# Patient Record
Sex: FEMALE | Race: WHITE | NOT HISPANIC OR LATINO | Employment: FULL TIME | URBAN - METROPOLITAN AREA
[De-identification: names, ages, dates, MRNs, and addresses within clinical notes are randomized per-mention and may not be internally consistent; named-entity substitution may affect disease eponyms.]

---

## 2017-04-24 DIAGNOSIS — Z30.40 ENCOUNTER FOR SURVEILLANCE OF CONTRACEPTIVES: ICD-10-CM

## 2017-04-24 RX ORDER — NORETHINDRONE ACETATE AND ETHINYL ESTRADIOL .03; 1.5 MG/1; MG/1
1 TABLET ORAL DAILY
Qty: 84 TABLET | Refills: 0 | Status: SHIPPED | OUTPATIENT
Start: 2017-04-24 | End: 2017-04-25

## 2017-04-24 NOTE — TELEPHONE ENCOUNTER
Pending Prescriptions:                       Disp   Refills    norethindrone-ethinyl estradiol (MICROGES*84 tab*0            Sig: Take 1 tablet by mouth daily Please schedule           annual exam in August for further refills    Last OV was 8/4/16. Will be due for AE. Refill sent to pharmacy.   Mary Yadav, RN-BSN

## 2017-04-25 DIAGNOSIS — Z30.40 ENCOUNTER FOR SURVEILLANCE OF CONTRACEPTIVES: ICD-10-CM

## 2017-04-25 RX ORDER — NORETHINDRONE ACETATE AND ETHINYL ESTRADIOL .03; 1.5 MG/1; MG/1
1 TABLET ORAL DAILY
Qty: 112 TABLET | Refills: 0 | Status: SHIPPED | OUTPATIENT
Start: 2017-04-25 | End: 2017-07-17

## 2017-07-17 DIAGNOSIS — Z30.40 ENCOUNTER FOR SURVEILLANCE OF CONTRACEPTIVES: ICD-10-CM

## 2017-07-17 RX ORDER — NORETHINDRONE ACETATE AND ETHINYL ESTRADIOL .03; 1.5 MG/1; MG/1
1 TABLET ORAL DAILY
Qty: 112 TABLET | Refills: 0 | Status: SHIPPED | OUTPATIENT
Start: 2017-07-17

## 2017-07-17 NOTE — TELEPHONE ENCOUNTER
Pending Prescriptions:                       Disp   Refills    norethindrone-ethinyl estradiol (MICROGES*112 ta*0            Sig: Take 1 tablet by mouth daily To be taken           continuously    Last OV was 8/4/16. Due for AE in Aug. TC to patient. Scheduled AE for 8/15. Refill sent to pharmacy.   Mary Yadav, BRITTANY-BSN

## 2017-12-08 ENCOUNTER — THERAPY VISIT (OUTPATIENT)
Dept: PHYSICAL THERAPY | Facility: CLINIC | Age: 47
End: 2017-12-08
Payer: COMMERCIAL

## 2017-12-08 DIAGNOSIS — M75.41 IMPINGEMENT SYNDROME OF SHOULDER REGION, RIGHT: Primary | ICD-10-CM

## 2017-12-08 PROCEDURE — 97110 THERAPEUTIC EXERCISES: CPT | Mod: GP | Performed by: PHYSICAL THERAPIST

## 2017-12-08 PROCEDURE — 97161 PT EVAL LOW COMPLEX 20 MIN: CPT | Mod: GP | Performed by: PHYSICAL THERAPIST

## 2017-12-08 NOTE — MR AVS SNAPSHOT
After Visit Summary   12/8/2017    Shweta Roberts    MRN: [id number]           Patient Information     Date Of Birth          1970        Visit Information        Provider Department      12/8/2017 5:10 PM Mario Martins PT [facility]         Follow-ups after your visit        Your next 10 appointments already scheduled     Dec 08, 2017  5:10 PM CST   (Arrive by 4:55 PM)   ALLI Extremity with Mario Martins PT   [facility] ( [facility])    [facility] [address] [address] [phone]              Who to contact     If you have questions or need follow up information about today's clinic visit or your schedule please contact [facility] directly at [phone].  Normal or non-critical lab and imaging results will be communicated to you by MyChart, letter or phone within 4 business days after the clinic has received the results. If you do not hear from us within 7 days, please contact the clinic through [employer] or phone. If you have a critical or abnormal lab result, we will notify you by phone as soon as possible.  Submit refill requests through [employer] or call your pharmacy and they will forward the refill request to us. Please allow 3 business days for your refill to be completed.          Additional Information About Your Visit        MyChart Information     [employer] gives you secure access to your electronic health record. If you see a primary care provider, you can also send messages to your care team and make appointments. If you have questions, please call your primary care clinic.  If you do not have a primary care provider, please call [phone] and they will assist you.        Care EveryWhere ID     This is your Care EveryWhere ID. This could be used by other organizations to access your [facility] medical records  [id number]          Blood Pressure from Last 3 Encounters:   No data found for BP    Weight from Last 3 Encounters:   No data found for Wt              Today, you had the following     No orders found for display       Primary Care Provider    None Specified       No primary provider on file.        Equal Access to Services     LINDY MATA : Luke rj card britney Graham, danish salinas, kobe kaann funes, aileen shine laYamilkaisaac weiss. So [facility] [phone].    ATENCIÓN: Si habla español, tiene a longoria disposición servicios gratuitos de asistencia lingüística. Llame al [phone].    We comply with applicable federal civil rights laws and [place] laws. We do not discriminate on the basis of race, color, national origin, age, disability, sex, sexual orientation, or gender identity.            Thank you!     Thank you for choosing [facility]  for your care. Our goal is always to provide you with excellent care. Hearing back from our patients is one way we can continue to improve our services. Please take a few minutes to complete the written survey that you may receive in the mail after your visit with us. Thank you!             Your Updated Medication List - Protect others around you: Learn how to safely use, store and throw away your medicines at www.disposemymeds.org.          This list is accurate as of: 12/8/17 10:55 AM.  Always use your most recent med list.                   Brand Name Dispense Instructions for use Diagnosis    albuterol 108 (90 BASE) MCG/ACT Inhaler    PROAIR HFA/PROVENTIL HFA/VENTOLIN HFA    1 Inhaler    Inhale 2 puffs into the lungs every 6 hours as needed for shortness of breath / dyspnea    Acute bronchitis, unspecified organism       fexofenadine 180 MG tablet    ALLEGRA    90 tablet    Take 1 tablet (180 mg) by mouth daily    Allergic rhinitis       LORAZEPAM      Patient takes prn, but unsure what dosage?        norethindrone-ethinyl estradiol  1.5-30 MG-MCG per tablet    MICROGESTIN    112 tablet    Take 1 tablet by mouth daily To be taken continuously    Encounter for surveillance of contraceptives       pimecrolimus 1 % cream    ELIDEL    60 g    Apply twice daily as needed to areas of psoriasis    Psoriasis

## 2017-12-08 NOTE — PROGRESS NOTES
Physical Therapy Initial Examination/Evaluation  December 8, 2017    Shweta Roberts is a 47 year old female referred to physical therapy by Wojciech Carrillo MD for treatment of R shoulder impingement with Precautions/Restrictions/MD instructions E&T    Therapist Impression:   Shweta presents to physical therapy with R shoulder RC pathology.  Our treatment focus will be on scapular and rotator cuff stabilization/strengthening and posture re-education.    Subjective:  DOI/onset: 2-3 months ago; 12/7/2017 MD appt DOS: none  Acute Injury or Gradual Onset?: Acute injury onset  Mechanism of Injury: Reaching into back seat  Related PMH: None Previous Treatment: Chiropractor and  Effect of prior treatment: fair  Imaging: None  Chief Complaint/Functional Limitations:   Reaching OH and lifting weights and see below in therapy evaluation codes   Pain: rest 0 /10, activity 4/10 Location: lateral Frequency: Intermittent Described as: sore, achy Alleviated by: Rest Progression of Symptoms: Gradually getting better. Time of day when pain is worse: Activity related  Sleeping: Previously was losing sleep but is improved   Occupation: Sales  Job duties: keyboarding/computer use  Current HEP/exercise regimen: Working with   Patient's goals are see chief complaints     Other pertinent PMH/Red Flags: osteoarthritis   Barriers at home/work: None as reported by patient  Pertinent Surgical History: Breast reduction  Medications: None as reported by patient  General health as reported by patient: good/excellent  Return to MD:  prn    SHOULDER EXAMINATION  STATIC POSTURE  Forward head: mild   Rounded shoulders:mild  Shoulder internally rotated: mild   Visual inspection: NA    SHOULDER RANGE OF MOTION  AROM Flexion Abduction ER Base Ext/IR or hand behind back angle   Left 150 150  Ant Drift: na 60 T11   Right 120 pain 110 pain  Ant Drift: none 60 T12 pain   Pain: yes      PROM Flexion Abd 90-90 ER 90-90 IR Scap Stabilized Horizontal  Adduction   Left na na  na na na   Right wnl na 80 pain na na   GH indicates pure glenohumeral ROM    SHOULDER STRENGTH  MMT Flexion Scaption ER IR   Left 5/5 5/5 5/5 5/5   Right 4+/5 4+/5 5/5 5/5     Assessment/Plan:  Patient is a 47 year old female with right side shoulder complaints.    Patient has the following significant findings with corresponding treatment plan.                Diagnosis 1:  R shoulder impingement  Pain -  hot/cold therapy, manual therapy, splint/taping/bracing/orthotics, self management, education and home program  Decreased ROM/flexibility - manual therapy and therapeutic exercise  Decreased strength - therapeutic exercise and therapeutic activities  Impaired muscle performance - neuro re-education  Impaired posture - neuro re-education    Therapy Evaluation Codes:   1) History comprised of:   Personal factors that impact the plan of care:      None.    Comorbidity factors that impact the plan of care are:      None.     Medications impacting care: None.  2) Examination of Body Systems comprised of:   Body structures and functions that impact the plan of care:      Shoulder.   Activity limitations that impact the plan of care are:      Bathing, Dressing and Lifting.  3) Clinical presentation characteristics are:   Stable/Uncomplicated.  4) Decision-Making    Low complexity using standardized patient assessment instrument and/or measureable assessment of functional outcome.  Cumulative Therapy Evaluation is: Low complexity.    Previous and current functional limitations:  (See Goal Flow Sheet for this information)    Short term and Long term goals: (See Goal Flow Sheet for this information)     Communication ability:  Patient appears to be able to clearly communicate and understand verbal and written communication and follow directions correctly.  Treatment Explanation - The following has been discussed with the patient:   RX ordered/plan of care  Anticipated outcomes  Possible risks and  side effects  This patient would benefit from PT intervention to resume normal activities.   Rehab potential is good.    Frequency:  2 X a month, once daily  Duration:  for 4 months  Discharge Plan:  Achieve all LTG.  Independent in home treatment program.  Reach maximal therapeutic benefit.    Please refer to the daily flowsheet for treatment today, total treatment time and time spent performing 1:1 timed codes.         Exercise Sets x Reps Frequency Goal   Reverse flies  3 x week X 30   Rowing with arms near side of body   X 30   Triceps (not overhead)   X 20   External rotation   X 30   Internal rotation   X 30   Pendulum/circles on back   X 30

## 2017-12-12 ENCOUNTER — OFFICE VISIT (OUTPATIENT)
Dept: FAMILY MEDICINE | Facility: CLINIC | Age: 47
End: 2017-12-12
Payer: COMMERCIAL

## 2017-12-12 VITALS
HEART RATE: 77 BPM | RESPIRATION RATE: 14 BRPM | OXYGEN SATURATION: 99 % | TEMPERATURE: 98.5 F | WEIGHT: 219 LBS | SYSTOLIC BLOOD PRESSURE: 110 MMHG | BODY MASS INDEX: 32.34 KG/M2 | DIASTOLIC BLOOD PRESSURE: 72 MMHG

## 2017-12-12 DIAGNOSIS — J01.90 ACUTE SINUSITIS WITH SYMPTOMS > 10 DAYS: Primary | ICD-10-CM

## 2017-12-12 DIAGNOSIS — J30.9 ALLERGIC RHINITIS, UNSPECIFIED CHRONICITY, UNSPECIFIED SEASONALITY, UNSPECIFIED TRIGGER: ICD-10-CM

## 2017-12-12 DIAGNOSIS — F41.9 ANXIETY: ICD-10-CM

## 2017-12-12 PROCEDURE — 99214 OFFICE O/P EST MOD 30 MIN: CPT | Performed by: FAMILY MEDICINE

## 2017-12-12 RX ORDER — LORAZEPAM 100 %
POWDER (GRAM) MISCELLANEOUS
Status: CANCELLED | OUTPATIENT
Start: 2017-12-12

## 2017-12-12 RX ORDER — LORAZEPAM 0.5 MG/1
0.25-0.5 TABLET ORAL EVERY 8 HOURS PRN
Qty: 15 TABLET | Refills: 0 | Status: SHIPPED | OUTPATIENT
Start: 2017-12-12

## 2017-12-12 RX ORDER — DOXYCYCLINE 100 MG/1
100 CAPSULE ORAL 2 TIMES DAILY
Qty: 14 CAPSULE | Refills: 0 | Status: SHIPPED | OUTPATIENT
Start: 2017-12-12 | End: 2017-12-19

## 2017-12-12 NOTE — PROGRESS NOTES
SUBJECTIVE:   Shweta Roberts is a 47 year old female who presents to clinic today for the following health issues:      Acute Illness   Acute illness concerns: Sinus Infection  Onset: Couple weeks     Fever: YES    Chills/Sweats: YES    Headache (location?): YES    Sinus Pressure:YES    Conjunctivitis:  YES: bilateral    Ear Pain: no    Rhinorrhea: YES    Congestion: YES    Sore Throat: YES     Cough: YES-productive of yellow sputum    Wheeze: no    Decreased Appetite: YES    Nausea: no    Vomiting: no    Diarrhea:  no    Dysuria/Freq.: YES- increased fluids     Fatigue/Achiness: Fatigue     Sick/Strep Exposure: no     Therapies Tried and outcome: Vitamins, Mucinex, Advil and Sudafed          In October she developed a cold, symptoms improved, but continued to drag on. The last couple of weeks she has had sinus symptoms, with facial pain, congestion, rhinorrhea, etc. Her asthma has not flared. It typically bothers her when it is humid outside and she is running. She initially used her inhaler with chest tightness but has not had to use it in the last couple weeks.    She is requesting to have her lorazepam refilled. She uses it infrequently for panic attacks. It helps her knowing that the medication is there. She has had 2-3 refills in the past 7-8 years. Patient is not taking a daily anti anxiety medication. She runs for anxiety relief.       Problem list and histories reviewed & adjusted, as indicated.  Additional history: as documented  Patient Active Problem List   Diagnosis     Anxiety     Asthma, mild intermittent     Allergic rhinitis     Knee pain     Hamstring muscle strain     Pain in joint involving ankle and foot     Psoriasis     Neoplasm of uncertain behavior of skin     S/P LEEP of cervix     Impingement syndrome of shoulder region, right     Past Surgical History:   Procedure Laterality Date     BREAST BIOPSY, CORE RT/LT  1999    left s/p nipple discharge     LEEP TX, CERVICAL  2012?    [facility]  [place]     wisdom teeth      extracted       Social History   Substance Use Topics     Smoking status: Never Smoker     Smokeless tobacco: Never Used     Alcohol use Yes      Comment: occ.     Family History   Problem Relation Age of Onset     CANCER Mother      skin cancer     CANCER Father      skin cancer     CANCER Sister      skin cancer         Current Outpatient Prescriptions   Medication Sig Dispense Refill     doxycycline (VIBRAMYCIN) 100 MG capsule Take 1 capsule (100 mg) by mouth 2 times daily for 7 days 14 capsule 0     LORazepam (ATIVAN) 0.5 MG tablet Take 0.5-1 tablets (0.25-0.5 mg) by mouth every 8 hours as needed for anxiety Do not operate a vehicle after taking this medication 15 tablet 0     norethindrone-ethinyl estradiol (MICROGESTIN) 1.5-30 MG-MCG per tablet Take 1 tablet by mouth daily To be taken continuously 112 tablet 0     albuterol (PROAIR HFA, PROVENTIL HFA, VENTOLIN HFA) 108 (90 BASE) MCG/ACT inhaler Inhale 2 puffs into the lungs every 6 hours as needed for shortness of breath / dyspnea 1 Inhaler 0     fexofenadine (ALLEGRA) 180 MG tablet Take 1 tablet (180 mg) by mouth daily 90 tablet 3     pimecrolimus (ELIDEL) 1 % cream Apply twice daily as needed to areas of psoriasis 60 g 3     Allergies   Allergen Reactions     Amoxicillin Hives     Mold      Recent Labs   Lab Test  07/01/15   1156  09/11/13   0853   HDL  75   --    ALT   --   21   CR  0.74  0.77   GFRESTIMATED  85  82   GFRESTBLACK  >90   GFR Calc    >90   POTASSIUM  3.9  4.1   TSH  0.59   --       BP Readings from Last 3 Encounters:   12/12/17 110/72   08/04/16 125/78   02/29/16 130/66    Wt Readings from Last 3 Encounters:   12/12/17 99.3 kg (219 lb)   08/04/16 94.7 kg (208 lb 12.8 oz)   02/29/16 93.9 kg (207 lb)        Reviewed and updated as needed this visit by clinical staffTobacco  Allergies  Meds  Med Hx  Surg Hx  Fam Hx  Soc Hx      Reviewed and updated as needed this visit by Provider          ROS:  See above.    This document serves as a record of the services and decisions personally performed and made by Zoila Kunz MD. It was created on his/her behalf by Anahi Keller, trained medical scribe. The creation of this document is based the provider's statements to the medical scribes.    Scribe Anahi Keller, December 12, 2017  OBJECTIVE:     /72  Pulse 77  Temp 98.5  F (36.9  C) (Oral)  Resp 14  Wt 99.3 kg (219 lb)  SpO2 99%  BMI 32.34 kg/m2  Body mass index is 32.34 kg/(m^2).  GENERAL: healthy, alert and no distress  HENT: ear canals and TM's normal, nose and mouth without ulcers or lesions  NECK: no adenopathy, no asymmetry, masses, or scars and thyroid normal to palpation  RESP: lungs clear to auscultation - no rales, rhonchi or wheezes  CV: regular rate and rhythm, normal S1 S2, no S3 or S4, no murmur, click or rub  PSYCH: mentation appears normal, affect normal/bright    Diagnostic Test Results:  none     ASSESSMENT/PLAN:   1. Acute sinusitis with symptoms > 10 days  Will treat for possible bacterial sinusitis. See pt instructions   - doxycycline (VIBRAMYCIN) 100 MG capsule; Take 1 capsule (100 mg) by mouth 2 times daily for 7 days  Dispense: 14 capsule; Refill: 0    2. Anxiety  Very rare use of lorazepam. Requests refill for episodes of panic. Her usual way to manage is running, which she is unable to do since she is ill.   - LORazepam (ATIVAN) 0.5 MG tablet; Take 0.5-1 tablets (0.25-0.5 mg) by mouth every 8 hours as needed for anxiety Do not operate a vehicle after taking this medication  Dispense: 15 tablet; Refill: 0    3. Allergic rhinitis, unspecified chronicity, unspecified seasonality, unspecified trigger  Uses nasal saline and OTC antihistamine        Patient Instructions     1. Start doxycycline 100mg twice daily for 7 days  2. Continue nasal saline  3. Let us know if you are not improving or if worsening   Acute Bacterial Rhinosinusitis (ABRS)    Acute bacterial  rhinosinusitis (ABRS) is an infection of your nasal cavity and sinuses. It s caused by bacteria. Acute means that you ve had symptoms for less than 4 weeks, but possibly up to 12 weeks.  Understanding your sinuses  The nasal cavity is the large air-filled space behind your nose. The sinuses are a group of spaces formed by the bones of your face. They connect with your nasal cavity. ABRS causes the tissue lining these spaces to become inflamed. Mucus may not drain normally. This leads to facial pain and other symptoms.  What causes ABRS?  ABRS most often follows an upper respiratory infection caused by a virus. Bacteria then infect the lining of your nasal cavity and sinuses. But you can also get ABRS if you have:    Nasal allergies    Long-term nasal swelling and congestion not caused by allergies    Blockage in the nose  Symptoms of ABRS  The symptoms of ABRS may be different for each person and include:    Nasal congestion or blockage    Pain or pressure in the face    Thick, colored drainage from the nose  Other symptoms may include:    Runny nose    Fluid draining from the nose down the throat (postnasal drip)    Headache    Cough    Pain    Fever  Diagnosing ABRS  ABRS may be diagnosed if you ve had an upper respiratory infection like a cold and cough for 10 or more days without improvement or with worsening symptoms. Your healthcare provider will ask about your symptoms and your medical history. The provider will check your vital signs, including your temperature. You ll have a physical exam. The healthcare provider will check your ears, nose, and throat. You likely won t need any tests. If ABRS comes back, you may have a culture or other tests.  Treatment for ABRS  Treatment may include:    Antibiotic medicine. This is for symptoms that last for at least 10 to 14 days.    Nasal corticosteroid medicine. Drops or spray used in the nose can lessen swelling and congestion.    Over-the-counter pain medicine. This  is to lessen sinus pain and pressure.    Nasal decongestant medicine. Spray or drops may help to lessen congestion. Do not use them for more than a few days.    Salt wash (saline irrigation). This can help to loosen mucus.  Possible complications of ABRS  ABRS may come back or become long-term (chronic). In rare cases, ABRS may cause complications such as:     Inflamed tissue around the brain and spinal cord (meningitis)    Inflamed tissue around the eyes (orbital cellulitis)    Inflamed bones around the sinuses (osteitis)  These problems may need to be treated in a hospital with intravenous (IV) antibiotic medicine or surgery.  When to call the healthcare provider  Call your healthcare provider if you have any of the following:    Symptoms that don t get better, or get worse    Symptoms that don t get better after 3 to 5 days on antibiotics    Trouble seeing    Swelling around your eyes    Confusion or trouble staying awake   Date Last Reviewed: 5/1/2017 2000-2017 [employer]. [address] [place] [address]. All rights reserved. This information is not intended as a substitute for professional medical care. Always follow your healthcare professional's instructions.            The information in this document, created by the medical scribe for me, accurately reflects the services I personally performed and the decisions made by me. I have reviewed and approved this document for accuracy. 12/12/17    Zoila Kunz MD  [facility]

## 2017-12-12 NOTE — PATIENT INSTRUCTIONS
1. Start doxycycline 100mg twice daily for 7 days  2. Continue nasal saline  3. Let us know if you are not improving or if worsening   Acute Bacterial Rhinosinusitis (ABRS)    Acute bacterial rhinosinusitis (ABRS) is an infection of your nasal cavity and sinuses. It s caused by bacteria. Acute means that you ve had symptoms for less than 4 weeks, but possibly up to 12 weeks.  Understanding your sinuses  The nasal cavity is the large air-filled space behind your nose. The sinuses are a group of spaces formed by the bones of your face. They connect with your nasal cavity. ABRS causes the tissue lining these spaces to become inflamed. Mucus may not drain normally. This leads to facial pain and other symptoms.  What causes ABRS?  ABRS most often follows an upper respiratory infection caused by a virus. Bacteria then infect the lining of your nasal cavity and sinuses. But you can also get ABRS if you have:    Nasal allergies    Long-term nasal swelling and congestion not caused by allergies    Blockage in the nose  Symptoms of ABRS  The symptoms of ABRS may be different for each person and include:    Nasal congestion or blockage    Pain or pressure in the face    Thick, colored drainage from the nose  Other symptoms may include:    Runny nose    Fluid draining from the nose down the throat (postnasal drip)    Headache    Cough    Pain    Fever  Diagnosing ABRS  ABRS may be diagnosed if you ve had an upper respiratory infection like a cold and cough for 10 or more days without improvement or with worsening symptoms. Your healthcare provider will ask about your symptoms and your medical history. The provider will check your vital signs, including your temperature. You ll have a physical exam. The healthcare provider will check your ears, nose, and throat. You likely won t need any tests. If ABRS comes back, you may have a culture or other tests.  Treatment for ABRS  Treatment may include:    Antibiotic medicine. This is  for symptoms that last for at least 10 to 14 days.    Nasal corticosteroid medicine. Drops or spray used in the nose can lessen swelling and congestion.    Over-the-counter pain medicine. This is to lessen sinus pain and pressure.    Nasal decongestant medicine. Spray or drops may help to lessen congestion. Do not use them for more than a few days.    Salt wash (saline irrigation). This can help to loosen mucus.  Possible complications of ABRS  ABRS may come back or become long-term (chronic). In rare cases, ABRS may cause complications such as:     Inflamed tissue around the brain and spinal cord (meningitis)    Inflamed tissue around the eyes (orbital cellulitis)    Inflamed bones around the sinuses (osteitis)  These problems may need to be treated in a hospital with intravenous (IV) antibiotic medicine or surgery.  When to call the healthcare provider  Call your healthcare provider if you have any of the following:    Symptoms that don t get better, or get worse    Symptoms that don t get better after 3 to 5 days on antibiotics    Trouble seeing    Swelling around your eyes    Confusion or trouble staying awake   Date Last Reviewed: 5/1/2017 2000-2017 [employer]. [address] [place] [address]. All rights reserved. This information is not intended as a substitute for professional medical care. Always follow your healthcare professional's instructions.

## 2017-12-12 NOTE — MR AVS SNAPSHOT
After Visit Summary   12/12/2017    Shweta Roberts    MRN: [id number]           Patient Information     Date Of Birth          1970        Visit Information        Provider Department      12/12/2017 2:40 PM Zoila Kunz MD Formerly named [facility]        Today's Diagnoses     Acute sinusitis with symptoms > 10 days    -  1    Anxiety        Allergic rhinitis, unspecified chronicity, unspecified seasonality, unspecified trigger          Care Instructions    1. Start doxycycline 100mg twice daily for 7 days  2. Continue nasal saline  3. Let us know if you are not improving or if worsening   Acute Bacterial Rhinosinusitis (ABRS)    Acute bacterial rhinosinusitis (ABRS) is an infection of your nasal cavity and sinuses. It s caused by bacteria. Acute means that you ve had symptoms for less than 4 weeks, but possibly up to 12 weeks.  Understanding your sinuses  The nasal cavity is the large air-filled space behind your nose. The sinuses are a group of spaces formed by the bones of your face. They connect with your nasal cavity. ABRS causes the tissue lining these spaces to become inflamed. Mucus may not drain normally. This leads to facial pain and other symptoms.  What causes ABRS?  ABRS most often follows an upper respiratory infection caused by a virus. Bacteria then infect the lining of your nasal cavity and sinuses. But you can also get ABRS if you have:    Nasal allergies    Long-term nasal swelling and congestion not caused by allergies    Blockage in the nose  Symptoms of ABRS  The symptoms of ABRS may be different for each person and include:    Nasal congestion or blockage    Pain or pressure in the face    Thick, colored drainage from the nose  Other symptoms may include:    Runny nose    Fluid draining from the nose down the throat (postnasal drip)    Headache    Cough    Pain    Fever  Diagnosing ABRS  ABRS may be diagnosed if you ve had an upper respiratory infection like a cold and  cough for 10 or more days without improvement or with worsening symptoms. Your healthcare provider will ask about your symptoms and your medical history. The provider will check your vital signs, including your temperature. You ll have a physical exam. The healthcare provider will check your ears, nose, and throat. You likely won t need any tests. If ABRS comes back, you may have a culture or other tests.  Treatment for ABRS  Treatment may include:    Antibiotic medicine. This is for symptoms that last for at least 10 to 14 days.    Nasal corticosteroid medicine. Drops or spray used in the nose can lessen swelling and congestion.    Over-the-counter pain medicine. This is to lessen sinus pain and pressure.    Nasal decongestant medicine. Spray or drops may help to lessen congestion. Do not use them for more than a few days.    Salt wash (saline irrigation). This can help to loosen mucus.  Possible complications of ABRS  ABRS may come back or become long-term (chronic). In rare cases, ABRS may cause complications such as:     Inflamed tissue around the brain and spinal cord (meningitis)    Inflamed tissue around the eyes (orbital cellulitis)    Inflamed bones around the sinuses (osteitis)  These problems may need to be treated in a hospital with intravenous (IV) antibiotic medicine or surgery.  When to call the healthcare provider  Call your healthcare provider if you have any of the following:    Symptoms that don t get better, or get worse    Symptoms that don t get better after 3 to 5 days on antibiotics    Trouble seeing    Swelling around your eyes    Confusion or trouble staying awake   Date Last Reviewed: 5/1/2017 2000-2017 [employer]. [address] [place] [address], [place] [address]. All rights reserved. This information is not intended as a substitute for professional medical care. Always follow your healthcare professional's instructions.                Follow-ups after your visit        Your  next 10 appointments already scheduled     Dec 21, 2017  7:00 AM CST   ALLI Extremity with Mario Villasenorbo, PT   [facility] ([facility])    [address] [place] [id number] [phone]            Dec 28, 2017  8:20 AM CST   ALLI Extremity with Mario Villasenorbo, PT   [facility] ([facility])    [address] [place] [id number] [phone]              Who to contact     If you have questions or need follow up information about today's clinic visit or your schedule please contact [facility] directly at [phone].  Normal or non-critical lab and imaging results will be communicated to you by [employer], letter or phone within 4 business days after the clinic has received the results. If you do not hear from us within 7 days, please contact the clinic through Open[employer]t or phone. If you have a critical or abnormal lab result, we will notify you by phone as soon as possible.  Submit refill requests through [employer] or call your pharmacy and they will forward the refill request to us. Please allow 3 business days for your refill to be completed.          Additional Information About Your Visit        Trial[employer]ach Information     [employer] gives you secure access to your electronic health record. If you see a primary care provider, you can also send messages to your care team and make appointments. If you have questions, please call your primary care clinic.  If you do not have a primary care provider, please call [phone] and they will assist you.        Care EveryWhere ID     This is your Care EveryWhere ID. This could be used by other organizations to access your [place] [facility] medical records  [id number]        Your Vitals Were     Pulse Temperature Respirations Pulse Oximetry BMI (Body Mass Index)       77 98.5  F (36.9  C) (Oral) 14 99% 32.34 kg/m2        Blood  Pressure from Last 3 Encounters:   12/12/17 110/72   08/04/16 125/78   02/29/16 130/66    Weight from Last 3 Encounters:   12/12/17 219 lb (99.3 kg)   08/04/16 208 lb 12.8 oz (94.7 kg)   02/29/16 207 lb (93.9 kg)              Today, you had the following     No orders found for display         Today's Medication Changes          These changes are accurate as of: 12/12/17  3:13 PM.  If you have any questions, ask your nurse or doctor.               Start taking these medicines.        Dose/Directions    doxycycline 100 MG capsule   Commonly known as:  VIBRAMYCIN   Used for:  Acute sinusitis with symptoms > 10 days   Started by:  Zoila Kunz MD        Dose:  100 mg   Take 1 capsule (100 mg) by mouth 2 times daily for 7 days   Quantity:  14 capsule   Refills:  0       LORazepam 0.5 MG tablet   Commonly known as:  ATIVAN   Used for:  Anxiety   Started by:  Zoila Kunz MD        Dose:  0.25-0.5 mg   Take 0.5-1 tablets (0.25-0.5 mg) by mouth every 8 hours as needed for anxiety Do not operate a vehicle after taking this medication   Quantity:  15 tablet   Refills:  0            Where to get your medicines      These medications were sent to [facility] [address] [place] [address] AT [address] [address]     Phone:  [phone]     doxycycline 100 MG capsule         Some of these will need a paper prescription and others can be bought over the counter.  Ask your nurse if you have questions.     Bring a paper prescription for each of these medications     LORazepam 0.5 MG tablet                Primary Care Provider    None Specified       No primary provider on file.        Equal Access to Services     [facility]: danish Funez, aileen patel. So [facility] [phone].    ATENCIÓN: Si habla español, tiene a longoria disposición servicios gratuitos de asistencia lingüística.  Margot santos [phone].    We comply with applicable federal civil rights laws and [place] laws. We do not discriminate on the basis of race, color, national origin, age, disability, sex, sexual orientation, or gender identity.            Thank you!     Thank you for choosing [facility]  for your care. Our goal is always to provide you with excellent care. Hearing back from our patients is one way we can continue to improve our services. Please take a few minutes to complete the written survey that you may receive in the mail after your visit with us. Thank you!             Your Updated Medication List - Protect others around you: Learn how to safely use, store and throw away your medicines at www.disposemymeds.org.          This list is accurate as of: 12/12/17  3:13 PM.  Always use your most recent med list.                   Brand Name Dispense Instructions for use Diagnosis    albuterol 108 (90 BASE) MCG/ACT Inhaler    PROAIR HFA/PROVENTIL HFA/VENTOLIN HFA    1 Inhaler    Inhale 2 puffs into the lungs every 6 hours as needed for shortness of breath / dyspnea    Acute bronchitis, unspecified organism       doxycycline 100 MG capsule    VIBRAMYCIN    14 capsule    Take 1 capsule (100 mg) by mouth 2 times daily for 7 days    Acute sinusitis with symptoms > 10 days       fexofenadine 180 MG tablet    ALLEGRA    90 tablet    Take 1 tablet (180 mg) by mouth daily    Allergic rhinitis       LORazepam 0.5 MG tablet    ATIVAN    15 tablet    Take 0.5-1 tablets (0.25-0.5 mg) by mouth every 8 hours as needed for anxiety Do not operate a vehicle after taking this medication    Anxiety       norethindrone-ethinyl estradiol 1.5-30 MG-MCG per tablet    MICROGESTIN    112 tablet    Take 1 tablet by mouth daily To be taken continuously    Encounter for surveillance of contraceptives       pimecrolimus 1 % cream    ELIDEL    60 g    Apply twice daily as needed to areas of psoriasis    Psoriasis

## 2017-12-13 ASSESSMENT — ASTHMA QUESTIONNAIRES: ACT_TOTALSCORE: 23

## 2017-12-21 ENCOUNTER — THERAPY VISIT (OUTPATIENT)
Dept: PHYSICAL THERAPY | Facility: CLINIC | Age: 47
End: 2017-12-21
Payer: COMMERCIAL

## 2017-12-21 DIAGNOSIS — M75.41 IMPINGEMENT SYNDROME OF SHOULDER REGION, RIGHT: ICD-10-CM

## 2017-12-21 PROCEDURE — 97140 MANUAL THERAPY 1/> REGIONS: CPT | Mod: GP | Performed by: PHYSICAL THERAPIST

## 2017-12-21 PROCEDURE — 97110 THERAPEUTIC EXERCISES: CPT | Mod: GP | Performed by: PHYSICAL THERAPIST

## 2017-12-21 PROCEDURE — 97530 THERAPEUTIC ACTIVITIES: CPT | Mod: GP | Performed by: PHYSICAL THERAPIST

## 2017-12-21 NOTE — MR AVS SNAPSHOT
After Visit Summary   12/21/2017    Shweta Roberts    MRN: [id number]           Patient Information     Date Of Birth          1970        Visit Information        Provider Department      12/21/2017 7:00 AM Mario Martins PT [facility]        Today's Diagnoses     Impingement syndrome of shoulder region, right           Follow-ups after your visit        Your next 10 appointments already scheduled     Dec 28, 2017  8:20 AM CST   ALLI Extremity with Mario Martins PT   [facility] ( [facility])    [address] [place] [id number] [phone]              Who to contact     If you have questions or need follow up information about today's clinic visit or your schedule please contact [facility] directly at [phone].  Normal or non-critical lab and imaging results will be communicated to you by [employer], letter or phone within 4 business days after the clinic has received the results. If you do not hear from us within 7 days, please contact the clinic through [employer] or phone. If you have a critical or abnormal lab result, we will notify you by phone as soon as possible.  Submit refill requests through [employer] or call your pharmacy and they will forward the refill request to us. Please allow 3 business days for your refill to be completed.          Additional Information About Your Visit        [employer] Information     [employer] gives you secure access to your electronic health record. If you see a primary care provider, you can also send messages to your care team and make appointments. If you have questions, please call your primary care clinic.  If you do not have a primary care provider, please call [phone] and they will assist you.        Care EveryWhere ID     This is your Care EveryWhere ID. This could be used by other organizations to  access your [facility] medical records  [id number]         Blood Pressure from Last 3 Encounters:   12/12/17 110/72   08/04/16 125/78   02/29/16 130/66    Weight from Last 3 Encounters:   12/12/17 99.3 kg (219 lb)   08/04/16 94.7 kg (208 lb 12.8 oz)   02/29/16 93.9 kg (207 lb)              We Performed the Following     MANUAL THER TECH,1+REGIONS,EA 15 MIN     THERAPEUTIC ACTIVITIES     THERAPEUTIC EXERCISES        Primary Care Provider    None Specified       No primary provider on file.        Equal Access to Services     [facility]: Hadii aad kyaw hadbrysono Sokatharine, waaxda luqadaha, qaybta kaalmada marin, aileen clayton . So [facility] [phone].    ATENCIÓN: Si habla español, tiene a longoria disposición servicios gratuitos de asistencia lingüística. Llame al [phone].    We comply with applicable federal civil rights laws and [place] laws. We do not discriminate on the basis of race, color, national origin, age, disability, sex, sexual orientation, or gender identity.            Thank you!     Thank you for choosing [facility] [place]  for your care. Our goal is always to provide you with excellent care. Hearing back from our patients is one way we can continue to improve our services. Please take a few minutes to complete the written survey that you may receive in the mail after your visit with us. Thank you!             Your Updated Medication List - Protect others around you: Learn how to safely use, store and throw away your medicines at www.disposemymeds.org.          This list is accurate as of: 12/21/17  7:48 AM.  Always use your most recent med list.                   Brand Name Dispense Instructions for use Diagnosis    albuterol 108 (90 BASE) MCG/ACT Inhaler    PROAIR HFA/PROVENTIL HFA/VENTOLIN HFA    1 Inhaler    Inhale 2 puffs into the lungs every 6 hours as needed for shortness of breath / dyspnea    Acute bronchitis, unspecified organism        fexofenadine 180 MG tablet    ALLEGRA    90 tablet    Take 1 tablet (180 mg) by mouth daily    Allergic rhinitis       LORazepam 0.5 MG tablet    ATIVAN    15 tablet    Take 0.5-1 tablets (0.25-0.5 mg) by mouth every 8 hours as needed for anxiety Do not operate a vehicle after taking this medication    Anxiety       norethindrone-ethinyl estradiol 1.5-30 MG-MCG per tablet    MICROGESTIN    112 tablet    Take 1 tablet by mouth daily To be taken continuously    Encounter for surveillance of contraceptives       pimecrolimus 1 % cream    ELIDEL    60 g    Apply twice daily as needed to areas of psoriasis    Psoriasis

## 2017-12-21 NOTE — PROGRESS NOTES
SHOULDER RANGE OF MOTION  AROM Flexion Abduction ER Base Ext/IR or hand behind back angle   Left 150 150  Ant Drift: na 60 T11   Right 114 pain  135 post  pain  Ant Drift: none 60 T12 pain   Pain: yes      PROM Flexion Abd 90-90 ER 90-90 IR Scap Stabilized Horizontal Adduction   Left na na  na na na   Right 125 na wnl no pain na na   GH indicates pure glenohumeral ROM    Exercise Sets x Reps Frequency Goal   Reverse flies  3 x week X 30   Rowing with arms near side of body   X 30   Triceps (not overhead)   X 20   External rotation   X 30   Internal rotation   X 30   Pendulum/circles on back   X 30   Bent over bowling    X 30   On back pull band apart to touch chest with arms straight   X 30

## 2017-12-28 ENCOUNTER — THERAPY VISIT (OUTPATIENT)
Dept: PHYSICAL THERAPY | Facility: CLINIC | Age: 47
End: 2017-12-28
Payer: COMMERCIAL

## 2017-12-28 DIAGNOSIS — M75.41 IMPINGEMENT SYNDROME OF SHOULDER REGION, RIGHT: ICD-10-CM

## 2017-12-28 PROCEDURE — 97140 MANUAL THERAPY 1/> REGIONS: CPT | Mod: GP | Performed by: PHYSICAL THERAPIST

## 2017-12-28 PROCEDURE — 97110 THERAPEUTIC EXERCISES: CPT | Mod: GP | Performed by: PHYSICAL THERAPIST

## 2017-12-28 PROCEDURE — 97530 THERAPEUTIC ACTIVITIES: CPT | Mod: GP | Performed by: PHYSICAL THERAPIST

## 2017-12-28 NOTE — PROGRESS NOTES
SHOULDER RANGE OF MOTION  AROM Flexion Abduction ER Base Ext/IR or hand behind back angle   Left 150 150  Ant Drift: na 60 T11   Right 135 post  pain  Ant Drift: none 60 T12 pain   Pain: yes      PROM Flexion Abd 90-90 ER 90-90 IR Scap Stabilized Horizontal Adduction   Left na na  na na na   Right 125 na wnl no pain na na   GH indicates pure glenohumeral ROM    Exercise Sets x Reps Frequency Goal   Reverse flies  3 x week X 30   Rowing with arms near side of body   X 30   Triceps (not overhead)   X 20   External rotation   X 30   Internal rotation   X 30   Pendulum/circles on back   X 30   Bent over bowling    X 30   On back pull band apart to touch chest with arms straight   X 30

## 2017-12-28 NOTE — MR AVS SNAPSHOT
After Visit Summary   12/28/2017    Shweta Roberts    MRN: [id number]           Patient Information     Date Of Birth          1970        Visit Information        Provider Department      12/28/2017 8:20 AM Mario Martins PT [facility]        Today's Diagnoses     Impingement syndrome of shoulder region, right           Follow-ups after your visit        Your next 10 appointments already scheduled     Jan 11, 2018  8:20 AM CST   ALLI Extremity with Mario Martins PT   [facility] ( [facility])    [address] [place] [id number] [phone]              Who to contact     If you have questions or need follow up information about today's clinic visit or your schedule please contact [facility] directly at [phone].  Normal or non-critical lab and imaging results will be communicated to you by [employer]hart, letter or phone within 4 business days after the clinic has received the results. If you do not hear from us within 7 days, please contact the clinic through [employer] or phone. If you have a critical or abnormal lab result, we will notify you by phone as soon as possible.  Submit refill requests through [email or web address] or call your pharmacy and they will forward the refill request to us. Please allow 3 business days for your refill to be completed.          Additional Information About Your Visit        [employer] Information     [email or web address] gives you secure access to your electronic health record. If you see a primary care provider, you can also send messages to your care team and make appointments. If you have questions, please call your primary care clinic.  If you do not have a primary care provider, please call [phone] and they will assist you.        Care EveryWhere ID     This is your Care EveryWhere ID. This could be used by other organizations to  access your [facility] medical records  [id number]         Blood Pressure from Last 3 Encounters:   12/12/17 110/72   08/04/16 125/78   02/29/16 130/66    Weight from Last 3 Encounters:   12/12/17 99.3 kg (219 lb)   08/04/16 94.7 kg (208 lb 12.8 oz)   02/29/16 93.9 kg (207 lb)              We Performed the Following     MANUAL THER TECH,1+REGIONS,EA 15 MIN     THERAPEUTIC ACTIVITIES     THERAPEUTIC EXERCISES        Primary Care Provider    None Specified       No primary provider on file.        Equal Access to Services     [facility]: Hadii aad kyaw hadasho Sokatharine, waaxda luqadaha, qaybta kaalmada marin, aileen clayton . So [facility] [phone].    ATENCIÓN: Si habla español, tiene a longoria disposición servicios gratuitos de asistencia lingüística. Llame al [phone].    We comply with applicable federal civil rights laws and [place] laws. We do not discriminate on the basis of race, color, national origin, age, disability, sex, sexual orientation, or gender identity.            Thank you!     Thank you for choosing [facility] [place]  for your care. Our goal is always to provide you with excellent care. Hearing back from our patients is one way we can continue to improve our services. Please take a few minutes to complete the written survey that you may receive in the mail after your visit with us. Thank you!             Your Updated Medication List - Protect others around you: Learn how to safely use, store and throw away your medicines at www.disposemymeds.org.          This list is accurate as of: 12/28/17  4:37 PM.  Always use your most recent med list.                   Brand Name Dispense Instructions for use Diagnosis    albuterol 108 (90 BASE) MCG/ACT Inhaler    PROAIR HFA/PROVENTIL HFA/VENTOLIN HFA    1 Inhaler    Inhale 2 puffs into the lungs every 6 hours as needed for shortness of breath / dyspnea    Acute bronchitis, unspecified organism        fexofenadine 180 MG tablet    ALLEGRA    90 tablet    Take 1 tablet (180 mg) by mouth daily    Allergic rhinitis       LORazepam 0.5 MG tablet    ATIVAN    15 tablet    Take 0.5-1 tablets (0.25-0.5 mg) by mouth every 8 hours as needed for anxiety Do not operate a vehicle after taking this medication    Anxiety       norethindrone-ethinyl estradiol 1.5-30 MG-MCG per tablet    MICROGESTIN    112 tablet    Take 1 tablet by mouth daily To be taken continuously    Encounter for surveillance of contraceptives       pimecrolimus 1 % cream    ELIDEL    60 g    Apply twice daily as needed to areas of psoriasis    Psoriasis

## 2018-01-11 ENCOUNTER — THERAPY VISIT (OUTPATIENT)
Dept: PHYSICAL THERAPY | Facility: CLINIC | Age: 48
End: 2018-01-11
Payer: COMMERCIAL

## 2018-01-11 DIAGNOSIS — M75.41 IMPINGEMENT SYNDROME OF SHOULDER REGION, RIGHT: ICD-10-CM

## 2018-01-11 PROCEDURE — 97530 THERAPEUTIC ACTIVITIES: CPT | Mod: GP | Performed by: PHYSICAL THERAPIST

## 2018-01-11 PROCEDURE — 97140 MANUAL THERAPY 1/> REGIONS: CPT | Mod: GP | Performed by: PHYSICAL THERAPIST

## 2018-01-11 NOTE — MR AVS SNAPSHOT
After Visit Summary   1/11/2018    Shweta Roberts    MRN: [id number]           Patient Information     Date Of Birth          1970        Visit Information        Provider Department      1/11/2018 8:20 AM Mario Martins PT [facility] [place]        Today's Diagnoses     Impingement syndrome of shoulder region, right           Follow-ups after your visit        Your next 10 appointments already scheduled     Rodríguez 15, 2018  6:00 PM CST   (Arrive by 5:45 PM)   Return Visit with SYDNEE Hernandez [facility] ([facility])    [address]  [address]rd [facility] [address] [phone]            Jan 29, 2018  3:00 PM CST   ALLI Extremity with Mario Martins PT   [facility] [place] ( [facility])    [facility]  [address]  [facility] [id number] [phone]              Who to contact     If you have questions or need follow up information about today's clinic visit or your schedule please contact [facility] directly at [phone].  Normal or non-critical lab and imaging results will be communicated to you by Reddithart, letter or phone within 4 business days after the clinic has received the results. If you do not hear from us within 7 days, please contact the clinic through Reddithart or phone. If you have a critical or abnormal lab result, we will notify you by phone as soon as possible.  Submit refill requests through ThumbAd or call your pharmacy and they will forward the refill request to us. Please allow 3 business days for your refill to be completed.          Additional Information About Your Visit        Reddithart Information     ThumbAd gives you secure access to your electronic health record. If you see a primary care provider, you can also send messages to your care team and make appointments. If you have questions,  please call your primary care clinic.  If you do not have a primary care provider, please call [phone] and they will assist you.        Care EveryWhere ID     This is your Care EveryWhere ID. This could be used by other organizations to access your [facility] medical records  [id number]         Blood Pressure from Last 3 Encounters:   12/12/17 110/72   08/04/16 125/78   02/29/16 130/66    Weight from Last 3 Encounters:   12/12/17 99.3 kg (219 lb)   08/04/16 94.7 kg (208 lb 12.8 oz)   02/29/16 93.9 kg (207 lb)              We Performed the Following     MANUAL THER TECH,1+REGIONS,EA 15 MIN     THERAPEUTIC ACTIVITIES        Primary Care Provider    None Specified       No primary provider on file.        Equal Access to Services     LINDY MATA : Luke Graham, wasantana tuckerqadaha, qaybta kaalmaalejandro funes, aileen clayton . So [facility] [phone].    ATENCIÓN: Si habla español, tiene a longoria disposición servicios gratuitos de asistencia lingüística. Llame al [phone].    We comply with applicable federal civil rights laws and [place] laws. We do not discriminate on the basis of race, color, national origin, age, disability, sex, sexual orientation, or gender identity.            Thank you!     Thank you for choosing [facility] [place]  for your care. Our goal is always to provide you with excellent care. Hearing back from our patients is one way we can continue to improve our services. Please take a few minutes to complete the written survey that you may receive in the mail after your visit with us. Thank you!             Your Updated Medication List - Protect others around you: Learn how to safely use, store and throw away your medicines at www.disposemymeds.org.          This list is accurate as of: 1/11/18  9:02 AM.  Always use your most recent med list.                   Brand Name Dispense Instructions for use Diagnosis    albuterol 108  (90 BASE) MCG/ACT Inhaler    PROAIR HFA/PROVENTIL HFA/VENTOLIN HFA    1 Inhaler    Inhale 2 puffs into the lungs every 6 hours as needed for shortness of breath / dyspnea    Acute bronchitis, unspecified organism       fexofenadine 180 MG tablet    ALLEGRA    90 tablet    Take 1 tablet (180 mg) by mouth daily    Allergic rhinitis       LORazepam 0.5 MG tablet    ATIVAN    15 tablet    Take 0.5-1 tablets (0.25-0.5 mg) by mouth every 8 hours as needed for anxiety Do not operate a vehicle after taking this medication    Anxiety       norethindrone-ethinyl estradiol 1.5-30 MG-MCG per tablet    MICROGESTIN    112 tablet    Take 1 tablet by mouth daily To be taken continuously    Encounter for surveillance of contraceptives       pimecrolimus 1 % cream    ELIDEL    60 g    Apply twice daily as needed to areas of psoriasis    Psoriasis

## 2018-01-15 ENCOUNTER — OFFICE VISIT (OUTPATIENT)
Dept: DERMATOLOGY | Facility: CLINIC | Age: 48
End: 2018-01-15

## 2018-01-15 DIAGNOSIS — D48.5 NEOPLASM OF UNCERTAIN BEHAVIOR OF SKIN: Primary | ICD-10-CM

## 2018-01-15 DIAGNOSIS — L82.1 STUCCO KERATOSES: ICD-10-CM

## 2018-01-15 DIAGNOSIS — Z12.83 SKIN CANCER SCREENING: ICD-10-CM

## 2018-01-15 DIAGNOSIS — L40.9 PSORIASIS: ICD-10-CM

## 2018-01-15 RX ORDER — PIMECROLIMUS 10 MG/G
CREAM TOPICAL
Qty: 60 G | Refills: 3 | Status: SHIPPED | OUTPATIENT
Start: 2018-01-15 | End: 2021-01-27

## 2018-01-15 ASSESSMENT — PAIN SCALES - GENERAL
PAINLEVEL: NO PAIN (0)
PAINLEVEL: NO PAIN (0)

## 2018-01-15 NOTE — MR AVS SNAPSHOT
After Visit Summary   1/15/2018    Shweta Roberts    MRN: [id number]           Patient Information     Date Of Birth          1970        Visit Information        Provider Department      1/15/2018 6:00 PM Mari Navarro PA-C [facility]        Today's Diagnoses     Neoplasm of uncertain behavior of skin    -  1    Psoriasis        Stucco keratoses        Skin cancer screening          Care Instructions    Wound Care After a Biopsy    What is a skin biopsy?  A skin biopsy allows the doctor to examine a very small piece of tissue under the microscope to determine the diagnosis and the best treatment for the skin condition. A local anesthetic (numbing medicine)  is injected with a very small needle into the skin area to be tested. A small piece of skin is taken from the area. Sometimes a suture (stitch) is used.     What are the risks of a skin biopsy?  I will experience scar, bleeding, swelling, pain, crusting and redness. I may experience incomplete removal or recurrence. Risks of this procedure are excessive bleeding, bruising, infection, nerve damage, numbness, thick (hypertrophic or keloidal) scar and non-diagnostic biopsy.    How should I care for my wound for the first 24 hours?    Keep the wound dry and covered for 24 hours    If it bleeds, hold direct pressure on the area for 15 minutes. If bleeding does not stop then go to the emergency room    Avoid strenuous exercise the first 1-2 days or as your doctor instructs you    How should I care for the wound after 24 hours?    After 24 hours, remove the bandage    You may bathe or shower as normal    If you had a scalp biopsy, you can shampoo as usual and can use shower water to clean the biopsy site daily    Clean the wound twice a day with gentle soap and water    Do not scrub, be gentle    Apply white petroleum/Vaseline after cleaning the wound with a cotton swab or a clean finger, and keep the site covered with a  Bandaid /bandage. Bandages are not necessary with a scalp biopsy    If you are unable to cover the site with a Bandaid /bandage, re-apply ointment 2-3 times a day to keep the site moist. Moisture will help with healing    Avoid strenuous activity for first 1-2 days    Avoid lakes, rivers, pools, and oceans until the stitches are removed or the site is healed    How do I clean my wound?    Wash hands thoroughly with soap or use hand  before all wound care    Clean the wound with gentle soap and water    Apply white petroleum/Vaseline  to wound after it is clean    Replace the Bandaid /bandage to keep the wound covered for the first few days or as instructed by your doctor    If you had a scalp biopsy, warm shower water to the area on a daily basis should suffice    What should I use to clean my wound?     Cotton-tipped applicators (Qtips )    White petroleum jelly (Vaseline ). Use a clean new container and use Q-tips to apply.    Bandaids   as needed    Gentle soap     How should I care for my wound long term?    Do not get your wound dirty    Keep up with wound care for one week or until the area is healed.    A small scab will form and fall off by itself when the area is completely healed. The area will be red and will become pink in color as it heals. Sun protection is very important for how your scar will turn out. Sunscreen with an SPF 30 or greater is recommended once the area is healed.    If you have stitches, stitches need to be removed in  days. You may return to our clinic for this or you may have it done locally at your doctor s office.    You should have some soreness but it should be mild and slowly go away over several days. Talk to your doctor about using tylenol for pain,    When should I call my doctor?  If you have increased:     Pain or swelling    Pus or drainage (clear or slightly yellow drainage is ok)    Temperature over 100F    Spreading redness or warmth around wound    When will I  hear about my results?  The biopsy results can take 2-3 weeks to come back. The clinic will call you with the results, send you a Last.fm message, or have you schedule a follow-up clinic or phone time to discuss the results. Contact our clinics if you do not hear from us in 3 weeks.     Who should I call with questions?    [facility]: [phone]     [facility]: [phone]    For urgent needs outside of business hours call the [facility] at [phone] and ask for the dermatology resident on call              Follow-ups after your visit        Your next 10 appointments already scheduled     Jan 29, 2018  3:00 PM CST   ALLI Extremity with Mario Martins PT   [facility] ([facility])    [address] [place] [address] [phone]              Who to contact     Please call your clinic at [phone] to:    Ask questions about your health    Make or cancel appointments    Discuss your medicines    Learn about your test results    Speak to your doctor   If you have compliments or concerns about an experience at your clinic, or if you wish to file a complaint, please contact [facility] Patient Relations at [phone] or email us at [email or web address]         Additional Information About Your Visit        Kanobu NetworkharCuutio Software Information     The Blaze gives you secure access to your electronic health record. If you see a primary care provider, you can also send messages to your care team and make appointments. If you have questions, please call your primary care clinic.  If you do not have a primary care provider, please call [phone] and they will assist you.      The Blaze is an electronic gateway that provides easy, online access to your medical records. With The Blaze, you can request a clinic appointment, read your test results, renew a  prescription or communicate with your care team.     To access your existing account, please contact your [facility] or call [phone] for assistance.        Care EveryWhere ID     This is your Care EveryWhere ID. This could be used by other organizations to access your [place] medical records  [id number]         Blood Pressure from Last 3 Encounters:   12/12/17 110/72   08/04/16 125/78   02/29/16 130/66    Weight from Last 3 Encounters:   12/12/17 99.3 kg (219 lb)   08/04/16 94.7 kg (208 lb 12.8 oz)   02/29/16 93.9 kg (207 lb)              We Performed the Following     BIOPSY SKIN/SUBQ/MUC MEM, EACH ADDTL LESION     BIOPSY SKIN/SUBQ/MUC MEM, SINGLE LESION     Surgical pathology exam          Where to get your medicines      These medications were sent to [facility] [address] [address] [address]     Phone:  [phone]     pimecrolimus 1 % cream          Primary Care Provider    None Specified       No primary provider on file.        Equal Access to Services     [facility] : Hadflorinda Graham, watacoda brad, qasitata damon funes, aileen weiss. So [facility] [phone].    ATENCIÓN: Si habla español, tiene a longoria disposición servicios gratuitos de asistencia lingüística. Margot al [phone].    We comply with applicable federal civil rights laws and [place] laws. We do not discriminate on the basis of race, color, national origin, age, disability, sex, sexual orientation, or gender identity.            Thank you!     Thank you for choosing [facility] DER[facility]  for your care. Our goal is always to provide you with excellent care. Hearing back from our patients is one way we can continue to improve our services. Please take a few minutes to complete the written survey that you may receive in the mail after your visit with us. Thank you!              Your Updated Medication List - Protect others around you: Learn how to safely use, store and throw away your medicines at www.disposemymeds.org.          This list is accurate as of: 1/15/18  6:41 PM.  Always use your most recent med list.                   Brand Name Dispense Instructions for use Diagnosis    albuterol 108 (90 BASE) MCG/ACT Inhaler    PROAIR HFA/PROVENTIL HFA/VENTOLIN HFA    1 Inhaler    Inhale 2 puffs into the lungs every 6 hours as needed for shortness of breath / dyspnea    Acute bronchitis, unspecified organism       fexofenadine 180 MG tablet    ALLEGRA    90 tablet    Take 1 tablet (180 mg) by mouth daily    Allergic rhinitis       LORazepam 0.5 MG tablet    ATIVAN    15 tablet    Take 0.5-1 tablets (0.25-0.5 mg) by mouth every 8 hours as needed for anxiety Do not operate a vehicle after taking this medication    Anxiety       norethindrone-ethinyl estradiol 1.5-30 MG-MCG per tablet    MICROGESTIN    112 tablet    Take 1 tablet by mouth daily To be taken continuously    Encounter for surveillance of contraceptives       pimecrolimus 1 % cream    ELIDEL    60 g    Apply twice daily as needed to areas of psoriasis    Psoriasis

## 2018-01-15 NOTE — LETTER
1/15/2018       RE: Shweta Roberts  [address]  [facility] [address]     Dear Colleague,    Thank you for referring your patient, Shweta Roberts, to the [facility] at [facility]. Please see a copy of my visit note below.    [facility] Dermatology Note    Dermatology Problem List:  1. 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 Psoriasis  - Elidel 1% cream  3. NUB x 2  - left lower lateral abdomen, left vertex scalp s/p biopsy 1/15/18  4. Skin cancer screening 1/15/18    CC:   Chief Complaint   Patient presents with     Skin Check     Skin check, no lesions of concern noted.     Date of Service: Rodríguez 15, 2018    History of Present Illness:  Ms. Shweta Roberts is a 47 year old female who presents for a skin check. The patient was last seen on 5/19/15 by Dr. Michele when a biopsy was performed on the left chest which showed a traumatized macular SK and continued Elidel cream for psoriasis. Today the patient reports that she has a scab on her scalp that she most likely scratched at night, but she is unsure. She also reports that she has been using the Elidel 1% cream, but this is a very old tube so she is unsure how effective this is. The patient reports no other lesions of concern at this time.    Otherwise, the patient reports no painful, bleeding, nonhealing, or pruritic lesions, and denies new or changing moles.    Past Medical History:   Patient Active Problem List   Diagnosis     Anxiety     Asthma, mild intermittent     Allergic rhinitis     Knee pain     Hamstring muscle strain     Pain in joint involving ankle and foot     Psoriasis     Neoplasm of uncertain behavior of skin     S/P LEEP of cervix     Impingement syndrome of shoulder region, right     Past Medical History:   Diagnosis Date     Allergic rhinitis      Anxiety      Arthritis      Asthma, mild intermittent      CARDIOVASCULAR SCREENING; LDL GOAL LESS THAN 130       Past Surgical History:   Procedure Laterality Date     BREAST BIOPSY, CORE RT/LT  1999    left s/p nipple discharge     LEEP TX, CERVICAL  2012?    [facility]     wisdom teeth      extracted     Social History:  Patient has had a lot of sun exposure. She is now diligent about sunscreen, however, she is still outside frequently, exercising and boating.  She worked in sales, recently was laid off. Denies tobacco use.    Family History:  Multiple family members with basal cell carcinoma and squamous cell carcinoma.  No family history of melanoma.  She has also has a family history of hayfever, eczema and psoriasis.   Mother diagnosed with melanoma on her elbow, stage 0.     Medications:  Current Outpatient Prescriptions   Medication Sig Dispense Refill     LORazepam (ATIVAN) 0.5 MG tablet Take 0.5-1 tablets (0.25-0.5 mg) by mouth every 8 hours as needed for anxiety Do not operate a vehicle after taking this medication 15 tablet 0     norethindrone-ethinyl estradiol (MICROGESTIN) 1.5-30 MG-MCG per tablet Take 1 tablet by mouth daily To be taken continuously 112 tablet 0     albuterol (PROAIR HFA, PROVENTIL HFA, VENTOLIN HFA) 108 (90 BASE) MCG/ACT inhaler Inhale 2 puffs into the lungs every 6 hours as needed for shortness of breath / dyspnea 1 Inhaler 0     fexofenadine (ALLEGRA) 180 MG tablet Take 1 tablet (180 mg) by mouth daily 90 tablet 3     pimecrolimus (ELIDEL) 1 % cream Apply twice daily as needed to areas of psoriasis (Patient not taking: Reported on 1/15/2018) 60 g 3     Allergies:  Allergies   Allergen Reactions     Amoxicillin Hives     Mold      Review of Systems:  - Skin: As above in HPI. No additional skin concerns.    Physical exam:  Vitals: There were no vitals taken for this visit.  GEN: This is a well developed, well-nourished female in no acute distress, in a pleasant mood.      SKIN: Full skin, which includes the head/face, both arms, chest, back, abdomen,both legs, genitalia and/or groin  buttocks, digits and/or nails, was examined.  - 6 mm asymmetric brown variegated macule left lower lateral abdomen  - 7 mm pink scaly excoriated thin papule on the left vertex scalp  - Regular brown pigmented macules and papules are identified on the trunk, extremities.  - Superficial scaly patches to central upper chest, left dorsal 4th digit  - Stucco white stuck on papules on the extremities  - No other lesions of concern on areas examined.     Impression/Plan:  1. NUB - left lower lateral abdomen. Neoplasm of uncertain behavior. Differential diagnosis includes dysplastic nevus vs MM vs other.  - Shave biopsy:  After discussion of benefits and risks including but not limited to bleeding/bruising, pain/swelling, infection, scar, incomplete removal, nerve damage/numbness, recurrence, and non-diagnostic biopsy, written consent, verbal consent and photographs were obtained. Time-out was performed. The area was cleaned with isopropyl alcohol and was injected to obtain adequate anesthesia of the lesion on the left lower lateral abdomen. 0.5 ml of 1% lidocaine was injected to obtain adequate anesthesia. A  shave biopsy was performed. Hemostasis was achieved with aluminium chloride. Vaseline and a sterile dressing were applied. The patient tolerated the procedure and no complications were noted. The patient was provided with verbal and written post care instructions.      2. NUB - left vertex scalp. Neoplasm of uncertain behavior. Differential diagnosis includes ISK vs SCC vs other.  - Shave biopsy:  After discussion of benefits and risks including but not limited to bleeding/bruising, pain/swelling, infection, scar, incomplete removal, nerve damage/numbness, recurrence, and non-diagnostic biopsy, written consent, verbal consent and photographs were obtained. Time-out was performed. The area was cleaned with isopropyl alcohol and was injected to obtain adequate anesthesia of the lesion on the left vertex scalp. 1.0 ml of  1% lidocaine was injected to obtain adequate anesthesia. A  shave biopsy was performed. Hemostasis was achieved with aluminium chloride. Vaseline and a sterile dressing were applied. The patient tolerated the procedure and no complications were noted. The patient was provided with verbal and written post care instructions.      3. Clinically benign nevi - trunk, extremities  - No further intervention required. Patient to report changes.   - Sun precaution was advised including the use of sun screens of SPF 30 or higher, sun protective clothing, and avoidance of tanning beds.    4. Psoriasis  - Continue Elidel 1% cream - apply to affected areas BID as needed.    5. Stucco keratoses - upper and lower extremities  - Recommend good moisturization with urea or am lactin.    6. Family history of melanoma, squamous and basal cell cancers. Continue annual screenings.     Follow-up in 1 year pending biopsy results, earlier for new or changing lesions.    Staff Involved:  Staff Only    Scribe Disclosure:   Soumya OSBORNE, am serving as a scribe to document services personally performed by Mari Navarro PA-C, based on data collection and the provider's statements to me.    Provider Disclosure:   The documentation recorded by the scribe accurately reflects the services I personally performed and the decisions made by me.    All risks, benefits and alternatives were discussed with patient.  Patient is in agreement and understands the assessment and plan.  All questions were answered.  Sun Screen Education was given.   Return to Clinic annually or sooner as needed.   Mari Navarro PA-C   [facility]

## 2018-01-16 NOTE — PATIENT INSTRUCTIONS

## 2018-01-16 NOTE — PROGRESS NOTES
[facility] Dermatology Note    Dermatology Problem List:  1. 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 Psoriasis  - Elidel 1% cream  3. NUB x 2  - left lower lateral abdomen, left vertex scalp s/p biopsy 1/15/18  4. Skin cancer screening 1/15/18    CC:   Chief Complaint   Patient presents with     Skin Check     Skin check, no lesions of concern noted.     Date of Service: Rodríguez 15, 2018    History of Present Illness:  Ms. Shweta Roberts is a 47 year old female who presents for a skin check. The patient was last seen on 5/19/15 by Dr. Michele when a biopsy was performed on the left chest which showed a traumatized macular SK and continued Elidel cream for psoriasis. Today the patient reports that she has a scab on her scalp that she most likely scratched at night, but she is unsure. She also reports that she has been using the Elidel 1% cream, but this is a very old tube so she is unsure how effective this is. The patient reports no other lesions of concern at this time.    Otherwise, the patient reports no painful, bleeding, nonhealing, or pruritic lesions, and denies new or changing moles.    Past Medical History:   Patient Active Problem List   Diagnosis     Anxiety     Asthma, mild intermittent     Allergic rhinitis     Knee pain     Hamstring muscle strain     Pain in joint involving ankle and foot     Psoriasis     Neoplasm of uncertain behavior of skin     S/P LEEP of cervix     Impingement syndrome of shoulder region, right     Past Medical History:   Diagnosis Date     Allergic rhinitis      Anxiety      Arthritis      Asthma, mild intermittent      CARDIOVASCULAR SCREENING; LDL GOAL LESS THAN 130      Past Surgical History:   Procedure Laterality Date     BREAST BIOPSY, CORE RT/LT  1999    left s/p nipple discharge     LEEP TX, CERVICAL  2012?    [facility]     wisdom teeth      extracted     Social History:  Patient has had a lot of sun exposure. She is now diligent  about sunscreen, however, she is still outside frequently, exercising and boating.  She worked in sales, recently was laid off. Denies tobacco use.    Family History:  Multiple family members with basal cell carcinoma and squamous cell carcinoma.  No family history of melanoma.  She has also has a family history of hayfever, eczema and psoriasis.   Mother diagnosed with melanoma on her elbow, stage 0.     Medications:  Current Outpatient Prescriptions   Medication Sig Dispense Refill     LORazepam (ATIVAN) 0.5 MG tablet Take 0.5-1 tablets (0.25-0.5 mg) by mouth every 8 hours as needed for anxiety Do not operate a vehicle after taking this medication 15 tablet 0     norethindrone-ethinyl estradiol (MICROGESTIN) 1.5-30 MG-MCG per tablet Take 1 tablet by mouth daily To be taken continuously 112 tablet 0     albuterol (PROAIR HFA, PROVENTIL HFA, VENTOLIN HFA) 108 (90 BASE) MCG/ACT inhaler Inhale 2 puffs into the lungs every 6 hours as needed for shortness of breath / dyspnea 1 Inhaler 0     fexofenadine (ALLEGRA) 180 MG tablet Take 1 tablet (180 mg) by mouth daily 90 tablet 3     pimecrolimus (ELIDEL) 1 % cream Apply twice daily as needed to areas of psoriasis (Patient not taking: Reported on 1/15/2018) 60 g 3     Allergies:  Allergies   Allergen Reactions     Amoxicillin Hives     Mold      Review of Systems:  - Skin: As above in HPI. No additional skin concerns.    Physical exam:  Vitals: There were no vitals taken for this visit.  GEN: This is a well developed, well-nourished female in no acute distress, in a pleasant mood.      SKIN: Full skin, which includes the head/face, both arms, chest, back, abdomen,both legs, genitalia and/or groin buttocks, digits and/or nails, was examined.  - 6 mm asymmetric brown variegated macule left lower lateral abdomen  - 7 mm pink scaly excoriated thin papule on the left vertex scalp  - Regular brown pigmented macules and papules are identified on the trunk, extremities.  -  Superficial scaly patches to central upper chest, left dorsal 4th digit  - Stucco white stuck on papules on the extremities  - No other lesions of concern on areas examined.     Impression/Plan:  1. NUB - left lower lateral abdomen. Neoplasm of uncertain behavior. Differential diagnosis includes dysplastic nevus vs MM vs other.  - Shave biopsy:  After discussion of benefits and risks including but not limited to bleeding/bruising, pain/swelling, infection, scar, incomplete removal, nerve damage/numbness, recurrence, and non-diagnostic biopsy, written consent, verbal consent and photographs were obtained. Time-out was performed. The area was cleaned with isopropyl alcohol and was injected to obtain adequate anesthesia of the lesion on the left lower lateral abdomen. 0.5 ml of 1% lidocaine was injected to obtain adequate anesthesia. A  shave biopsy was performed. Hemostasis was achieved with aluminium chloride. Vaseline and a sterile dressing were applied. The patient tolerated the procedure and no complications were noted. The patient was provided with verbal and written post care instructions.      2. NUB - left vertex scalp. Neoplasm of uncertain behavior. Differential diagnosis includes ISK vs SCC vs other.  - Shave biopsy:  After discussion of benefits and risks including but not limited to bleeding/bruising, pain/swelling, infection, scar, incomplete removal, nerve damage/numbness, recurrence, and non-diagnostic biopsy, written consent, verbal consent and photographs were obtained. Time-out was performed. The area was cleaned with isopropyl alcohol and was injected to obtain adequate anesthesia of the lesion on the left vertex scalp. 1.0 ml of 1% lidocaine was injected to obtain adequate anesthesia. A  shave biopsy was performed. Hemostasis was achieved with aluminium chloride. Vaseline and a sterile dressing were applied. The patient tolerated the procedure and no complications were noted. The patient was  provided with verbal and written post care instructions.      3. Clinically benign nevi - trunk, extremities  - No further intervention required. Patient to report changes.   - Sun precaution was advised including the use of sun screens of SPF 30 or higher, sun protective clothing, and avoidance of tanning beds.    4. Psoriasis  - Continue Elidel 1% cream - apply to affected areas BID as needed.    5. Stucco keratoses - upper and lower extremities  - Recommend good moisturization with urea or am lactin.    6. Family history of melanoma, squamous and basal cell cancers. Continue annual screenings.     Follow-up in 1 year pending biopsy results, earlier for new or changing lesions.    Staff Involved:  Staff Only    Scribe Disclosure:   Soumya OSBORNE, am serving as a scribe to document services personally performed by Mari Navarro PA-C, based on data collection and the provider's statements to me.    Provider Disclosure:   The documentation recorded by the scribe accurately reflects the services I personally performed and the decisions made by me.    All risks, benefits and alternatives were discussed with patient.  Patient is in agreement and understands the assessment and plan.  All questions were answered.  Sun Screen Education was given.   Return to Clinic annually or sooner as needed.   Mari Navarro PA-C   [facility]

## 2018-01-16 NOTE — NURSING NOTE
Dermatology Rooming Note    Shweta Roberts's goals for this visit include:   Chief Complaint   Patient presents with     Skin Check     Skin check, no lesions of concern noted.     Yesika Easton LPN

## 2018-01-17 LAB — COPATH REPORT: NORMAL

## 2018-01-29 ENCOUNTER — THERAPY VISIT (OUTPATIENT)
Dept: PHYSICAL THERAPY | Facility: CLINIC | Age: 48
End: 2018-01-29
Payer: COMMERCIAL

## 2018-01-29 DIAGNOSIS — M75.41 IMPINGEMENT SYNDROME OF SHOULDER REGION, RIGHT: ICD-10-CM

## 2018-01-29 PROCEDURE — 97530 THERAPEUTIC ACTIVITIES: CPT | Mod: GP | Performed by: PHYSICAL THERAPIST

## 2018-01-29 PROCEDURE — 97140 MANUAL THERAPY 1/> REGIONS: CPT | Mod: GP | Performed by: PHYSICAL THERAPIST

## 2018-01-29 PROCEDURE — 97110 THERAPEUTIC EXERCISES: CPT | Mod: GP | Performed by: PHYSICAL THERAPIST

## 2018-04-01 ENCOUNTER — TRANSFERRED RECORDS (OUTPATIENT)
Dept: HEALTH INFORMATION MANAGEMENT | Facility: CLINIC | Age: 48
End: 2018-04-01

## 2019-06-11 ENCOUNTER — OFFICE VISIT (OUTPATIENT)
Dept: DERMATOLOGY | Facility: CLINIC | Age: 49
End: 2019-06-11
Payer: COMMERCIAL

## 2019-06-11 VITALS — HEART RATE: 74 BPM | DIASTOLIC BLOOD PRESSURE: 80 MMHG | SYSTOLIC BLOOD PRESSURE: 121 MMHG

## 2019-06-11 DIAGNOSIS — D22.9 MULTIPLE NEVI: ICD-10-CM

## 2019-06-11 DIAGNOSIS — L30.9 DERMATITIS: Primary | ICD-10-CM

## 2019-06-11 RX ORDER — MUPIROCIN 20 MG/G
OINTMENT TOPICAL
Qty: 30 G | Refills: 3 | Status: SHIPPED | OUTPATIENT
Start: 2019-06-11

## 2019-06-11 ASSESSMENT — PAIN SCALES - GENERAL: PAINLEVEL: NO PAIN (0)

## 2019-06-11 NOTE — LETTER
6/11/2019       RE: Shweta Roberts  [address]  [place] [address]     Dear Colleague,    Thank you for referring your patient, Shweta Roberts, to the [facility]ERMATOLOG[facility]. Please see a copy of my visit note below.    [facility] Dermatology Note      Dermatology Problem List:  1. 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 Psoriasis  - Elidel 1% cream  3.  left lower lateral abdomen nevus with moderate atypia, left vertex scalp inflamed seborrheic keratosis s/p biopsy 1/15/18  4. Skin cancer screening 6/11/2019        Encounter Date: Jun 11, 2019    CC:  Chief Complaint   Patient presents with     Skin Check     Shweta is here today for a skin check. She does not have any areas of concern.         History of Present Illness:  Ms. Shweta Roberts is a 48 year old female who presents for a skin check. The patient was last seen on 1/15/2018 when a biopsy was performed on the left lower lateral abdomen showing a compound dysplastic melanocytic nevus with moderate atypia and on the left scalp showing seborrheic keratosis. She has continued use of Elidel cream and kenalog spray for her psoriasis and states this has been very well managed. She is very active outside and has frequent sun exposure but is diligent about using sunscreen and wearing a hat. She has not noticed any change in size or pigmentation of her moles. She has no concerning areas of itching, bleeding, or other lesions of concern.     Past Medical History:   Patient Active Problem List   Diagnosis     Anxiety     Asthma, mild intermittent     Allergic rhinitis     Knee pain     Hamstring muscle strain     Pain in joint involving ankle and foot     Psoriasis     Neoplasm of uncertain behavior of skin     S/P LEEP of cervix     Impingement syndrome of shoulder region, right     Past Medical History:   Diagnosis Date     Allergic rhinitis      Anxiety      Arthritis       Asthma, mild intermittent      CARDIOVASCULAR SCREENING; LDL GOAL LESS THAN 130      Past Surgical History:   Procedure Laterality Date     BREAST BIOPSY, CORE RT/LT  1999    left s/p nipple discharge     LEEP TX, CERVICAL  2012?    [facility]     wisdom teeth      extracted       Social History:  Patient reports that she has never smoked. She has never used smokeless tobacco. She reports that she drinks alcohol. She reports that she does not use drugs.    Family History:  Family history of BCC, SCC, melanoma.  She has also has a family history of hayfever, eczema and psoriasis.   Mother diagnosed with melanoma on her elbow, stage 0.     Family History   Problem Relation Age of Onset     Cancer Mother         skin cancer     Cancer Father         skin cancer     Cancer Sister         skin cancer       Medications:  Current Outpatient Medications   Medication Sig Dispense Refill     albuterol (PROAIR HFA, PROVENTIL HFA, VENTOLIN HFA) 108 (90 BASE) MCG/ACT inhaler Inhale 2 puffs into the lungs every 6 hours as needed for shortness of breath / dyspnea 1 Inhaler 0     fexofenadine (ALLEGRA) 180 MG tablet Take 1 tablet (180 mg) by mouth daily 90 tablet 3     LORazepam (ATIVAN) 0.5 MG tablet Take 0.5-1 tablets (0.25-0.5 mg) by mouth every 8 hours as needed for anxiety Do not operate a vehicle after taking this medication 15 tablet 0     norethindrone-ethinyl estradiol (MICROGESTIN) 1.5-30 MG-MCG per tablet Take 1 tablet by mouth daily To be taken continuously 112 tablet 0     pimecrolimus (ELIDEL) 1 % cream Apply twice daily as needed to areas of psoriasis 60 g 3     Allergies   Allergen Reactions     Amoxicillin Hives     Mold          Review of Systems:  -As per HPI  -Constitutional: Otherwise feeling well today, in usual state of health.  -HEENT: Patient denies nonhealing oral sores.  -Skin: As above in HPI. No additional skin concerns.    Physical exam:  Vitals: /80 (BP Location: Right arm, Patient  Position: Sitting, Cuff Size: Adult Regular)   Pulse 74   GEN: This is a well developed, well-nourished female in no acute distress, in a pleasant mood.    SKIN: Full skin, which includes the head/face, both arms, chest, back, abdomen,both legs, buttocks, digits and/or nails, was examined.  -Andrade type I skin  -Waxy brown stuck on papules on the trunk and extremities.  -White stuck on papules on the upper and lower extremities.  -Dome shaped pink/red papules on the central chest.  -Several 3-5 mm brown papules and macules with globular pigmentation, more prominent in the mid/low back and trunk.  -Healed biopsy site on the left lower abdomen with no signs of re-pigmentation.   -Scalp without erythema or scale.   -No other lesions of concern on areas examined.     Impression/Plan:  1. Stucco keratoses - upper and lower extremities    Benign nature was discussed.No further intervention required at this time.     2. Family history of melanoma, SCC, BCC    Sun precaution was advised including the use of sun screens of SPF 30 or higher, sun protective clothing, and avoidance of tanning beds.    Continue annual skin checks.    3. Multiple clinically benign nevi on the back, chest, and extremities in sun exposed areas    No further intervention required. Patient to report changes.    4. Cherry angioma(s)    Benign nature was discussed.No further intervention required at this time.     5.   Dermatitis, right nare    Use mupirocin 2% ointment BID as needed      CC Referred Self, MD  No address on file on close of this encounter.  Follow-up in 1 year, earlier for new or changing lesions.     Staff Involved:  Ruth OSBORNE, MS3, saw and examined the patient and acted as a scribe in the presence of Dr. Cardona.    .I was present with the medical student who participated in the service and in the documentation of the note. I have verified the history and personally performed the physical exam and medical decision  making. I agree with the assessment and plan of care as documented in the note.  Renetta Cardona MD

## 2019-06-11 NOTE — PROGRESS NOTES
[facility] Dermatology Note      Dermatology Problem List:  1. 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 Psoriasis  - Elidel 1% cream  3.  left lower lateral abdomen nevus with moderate atypia, left vertex scalp inflamed seborrheic keratosis s/p biopsy 1/15/18  4. Skin cancer screening 6/11/2019        Encounter Date: Jun 11, 2019    CC:  Chief Complaint   Patient presents with     Skin Check     Shweta is here today for a skin check. She does not have any areas of concern.         History of Present Illness:  Ms. Shweta Roberts is a 48 year old female who presents for a skin check. The patient was last seen on 1/15/2018 when a biopsy was performed on the left lower lateral abdomen showing a compound dysplastic melanocytic nevus with moderate atypia and on the left scalp showing seborrheic keratosis. She has continued use of Elidel cream and kenalog spray for her psoriasis and states this has been very well managed. She is very active outside and has frequent sun exposure but is diligent about using sunscreen and wearing a hat. She has not noticed any change in size or pigmentation of her moles. She has no concerning areas of itching, bleeding, or other lesions of concern.     Past Medical History:   Patient Active Problem List   Diagnosis     Anxiety     Asthma, mild intermittent     Allergic rhinitis     Knee pain     Hamstring muscle strain     Pain in joint involving ankle and foot     Psoriasis     Neoplasm of uncertain behavior of skin     S/P LEEP of cervix     Impingement syndrome of shoulder region, right     Past Medical History:   Diagnosis Date     Allergic rhinitis      Anxiety      Arthritis      Asthma, mild intermittent      CARDIOVASCULAR SCREENING; LDL GOAL LESS THAN 130      Past Surgical History:   Procedure Laterality Date     BREAST BIOPSY, CORE RT/LT  1999    left s/p nipple discharge     LEEP TX, CERVICAL  2012?    [facility]     wisdom teeth       extracted       Social History:  Patient reports that she has never smoked. She has never used smokeless tobacco. She reports that she drinks alcohol. She reports that she does not use drugs.    Family History:  Family history of BCC, SCC, melanoma.  She has also has a family history of hayfever, eczema and psoriasis.   Mother diagnosed with melanoma on her elbow, stage 0.     Family History   Problem Relation Age of Onset     Cancer Mother         skin cancer     Cancer Father         skin cancer     Cancer Sister         skin cancer       Medications:  Current Outpatient Medications   Medication Sig Dispense Refill     albuterol (PROAIR HFA, PROVENTIL HFA, VENTOLIN HFA) 108 (90 BASE) MCG/ACT inhaler Inhale 2 puffs into the lungs every 6 hours as needed for shortness of breath / dyspnea 1 Inhaler 0     fexofenadine (ALLEGRA) 180 MG tablet Take 1 tablet (180 mg) by mouth daily 90 tablet 3     LORazepam (ATIVAN) 0.5 MG tablet Take 0.5-1 tablets (0.25-0.5 mg) by mouth every 8 hours as needed for anxiety Do not operate a vehicle after taking this medication 15 tablet 0     norethindrone-ethinyl estradiol (MICROGESTIN) 1.5-30 MG-MCG per tablet Take 1 tablet by mouth daily To be taken continuously 112 tablet 0     pimecrolimus (ELIDEL) 1 % cream Apply twice daily as needed to areas of psoriasis 60 g 3     Allergies   Allergen Reactions     Amoxicillin Hives     Mold          Review of Systems:  -As per HPI  -Constitutional: Otherwise feeling well today, in usual state of health.  -HEENT: Patient denies nonhealing oral sores.  -Skin: As above in HPI. No additional skin concerns.    Physical exam:  Vitals: /80 (BP Location: Right arm, Patient Position: Sitting, Cuff Size: Adult Regular)   Pulse 74   GEN: This is a well developed, well-nourished female in no acute distress, in a pleasant mood.    SKIN: Full skin, which includes the head/face, both arms, chest, back, abdomen,both legs, buttocks, digits and/or nails,  was examined.  -Andrade type I skin  -Waxy brown stuck on papules on the trunk and extremities.  -White stuck on papules on the upper and lower extremities.  -Dome shaped pink/red papules on the central chest.  -Several 3-5 mm brown papules and macules with globular pigmentation, more prominent in the mid/low back and trunk.  -Healed biopsy site on the left lower abdomen with no signs of re-pigmentation.   -Scalp without erythema or scale.   -No other lesions of concern on areas examined.     Impression/Plan:  1. Stucco keratoses - upper and lower extremities    Benign nature was discussed.No further intervention required at this time.     2. Family history of melanoma, SCC, BCC    Sun precaution was advised including the use of sun screens of SPF 30 or higher, sun protective clothing, and avoidance of tanning beds.    Continue annual skin checks.    3. Multiple clinically benign nevi on the back, chest, and extremities in sun exposed areas    No further intervention required. Patient to report changes.    4. Cherry angioma(s)    Benign nature was discussed.No further intervention required at this time.     5.   Dermatitis, right nare    Use mupirocin 2% ointment BID as needed      CC Referred Self, MD  No address on file on close of this encounter.  Follow-up in 1 year, earlier for new or changing lesions.     Staff Involved:  I, Ruth España, MS3, saw and examined the patient and acted as a scribe in the presence of Dr. Cardona.    .I was present with the medical student who participated in the service and in the documentation of the note. I have verified the history and personally performed the physical exam and medical decision making. I agree with the assessment and plan of care as documented in the note.  Renetta Cardona MD

## 2019-06-11 NOTE — NURSING NOTE
Dermatology Rooming Note    Shweta Roberts's goals for this visit include:   Chief Complaint   Patient presents with     Skin Check     Shweta is here today for a skin check. She does not have any areas of concern.     Claire Garcia, CMA

## 2019-12-09 ENCOUNTER — HEALTH MAINTENANCE LETTER (OUTPATIENT)
Age: 49
End: 2019-12-09

## 2020-03-15 ENCOUNTER — HEALTH MAINTENANCE LETTER (OUTPATIENT)
Age: 50
End: 2020-03-15

## 2021-01-05 ENCOUNTER — DOCUMENTATION ONLY (OUTPATIENT)
Dept: CARE COORDINATION | Facility: CLINIC | Age: 51
End: 2021-01-05

## 2021-01-06 ENCOUNTER — DOCUMENTATION ONLY (OUTPATIENT)
Dept: CARE COORDINATION | Facility: CLINIC | Age: 51
End: 2021-01-06

## 2021-01-27 ENCOUNTER — OFFICE VISIT (OUTPATIENT)
Dept: DERMATOLOGY | Facility: CLINIC | Age: 51
End: 2021-01-27
Payer: COMMERCIAL

## 2021-01-27 DIAGNOSIS — D22.9 MULTIPLE NEVI: ICD-10-CM

## 2021-01-27 DIAGNOSIS — L40.9 PSORIASIS: ICD-10-CM

## 2021-01-27 DIAGNOSIS — L30.9 DERMATITIS: Primary | ICD-10-CM

## 2021-01-27 DIAGNOSIS — L21.9 DERMATITIS, SEBORRHEIC: ICD-10-CM

## 2021-01-27 PROCEDURE — 99213 OFFICE O/P EST LOW 20 MIN: CPT | Mod: GC | Performed by: DERMATOLOGY

## 2021-01-27 RX ORDER — PIMECROLIMUS 10 MG/G
CREAM TOPICAL
Qty: 60 G | Refills: 3 | Status: SHIPPED | OUTPATIENT
Start: 2021-01-27

## 2021-01-27 ASSESSMENT — PAIN SCALES - GENERAL: PAINLEVEL: NO PAIN (0)

## 2021-01-27 NOTE — LETTER
1/27/2021       RE: Shweta Roberts  [address]  [place] [address]     Dear Colleague,    Thank you for referring your patient, Shweta Roberts, to the [facility] DERMATOLOGY CLINIC [place] [facility]. Please see a copy of my visit note below.    [facility] Dermatology Note  Encounter Date: Jan 27, 2021  Office Visit     Dermatology Problem List:  1. 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 Dysplastic nevi  - L lower lateral abdomen, compound dysplastic nevus with moderate atypia, s/p shave bx 1/15/18  3. Psoriasis  - Elidel 1% cream  4. Stucco keratosis, b/l lower extremities  - Amlactin daily  5. Scalp pruritus and seb derm  - T/Sal shampoo daily   - TAC 0.1% spray daily PRN      ____________________________________________    Assessment & Plan:     # Multiple clinically benign nevi on the back, chest, and extremities in sun exposed areas  - No concerning lesions present on examination today  - Sun protection: Counseled SPF30+ sunscreen, UPF clothing, sun avoidance, tanning bed avoidance.     # Psoriasis, well controlled with gentle skin care and Elidel 1% cream  - Continue to apply Elidel 1% cream   - Moisturize: Recommend good OTC moisturizer to full body, such as Cera-Ve, Janelle-cream, or Cetaphil. Advised best to apply after bathing to lock in moisture.    # Stucco keratosis, b/l lower extremities  - Apply Amlactin to the b/l lower extremities daily    # Superficial erosion on the columella and R lateral oral commisure  - Recommended application of mupirocin 2% ointment to affected areas of involvement BID    # Scalp pruritus with concomitant seb derm  - Recommended washing scalp with T/sal shampoo 2-3x per week  - Continue to apply TAC 0.1% spray to scalp daily PRN for pruritus      Procedures Performed:   None    Follow-up: 1 year(s) in-person, or  earlier for new or changing lesions    Staff and Resident:     Olivia Bray MD  PGY-3 Dermatology Resident   I, Renetta Cardona MD, saw this patient with the resident and agree with the resident s findings and plan of care as documented in the resident s note.    ____________________________________________    CC: Skin Check (shweta is coming in today for a skin check)    HPI:  Ms. Shweta Roberts is a(n) 50 year old female who presents today as a return patient for a skin check and follow-up of her psoriasis.  She was last seen in clinic on 6/11/19 at which time no lesions of concern were present. She was experiencing a mild dermatitis on around her R nare that she was recommended to treat with mupirocin ointment.  Since her last visit, she states that she has developed a mild flare a rash around her ears and scalp.  The rash around her ears has improved with application of Elidel 1%cream and she had a friend of hers who is a physician refill her rx for TAC 0.1% spray for her scalp dermatitis and pruritus. With the application of both of these products, her scalp pruritus and dermatitis around her ears has significantly improved. She denies any new, non-healing or tender lesions of concern that she would like to address today.    Patient is otherwise feeling well, without additional concerns.    Labs:  NA    Physical Exam:  Vitals: There were no vitals taken for this visit.  SKIN: Full skin, which includes the head/face, both arms, chest, back, abdomen,both legs, buttocks, digits and/or nails, was examined  - There are dome shaped bright red papules on the chest and back.   -There are thin, pink scaly patches and plaques behind the b/l postauricular neck  -Multiple regular brown pigmented macules and papules are identified on the chest, back, b/l upper and lower extremities.   -There is macular erythema of the scalp with mild flaky white scale.  -Scattered brown macules on sun exposed areas.  -There is a waxy stuck  on tan to brown papule on the b/l lower extremities and back.  -There is xerosis of the b/l lower extremities.   - No other lesions of concern on areas examined.     Medications:  Current Outpatient Medications   Medication     albuterol (PROAIR HFA, PROVENTIL HFA, VENTOLIN HFA) 108 (90 BASE) MCG/ACT inhaler     fexofenadine (ALLEGRA) 180 MG tablet     LORazepam (ATIVAN) 0.5 MG tablet     mupirocin (BACTROBAN) 2 % external ointment     norethindrone-ethinyl estradiol (MICROGESTIN) 1.5-30 MG-MCG per tablet     pimecrolimus (ELIDEL) 1 % cream     No current facility-administered medications for this visit.       Past Medical History:   Patient Active Problem List   Diagnosis     Anxiety     Asthma, mild intermittent     Allergic rhinitis     Knee pain     Hamstring muscle strain     Pain in joint involving ankle and foot     Psoriasis     Neoplasm of uncertain behavior of skin     S/P LEEP of cervix     Impingement syndrome of shoulder region, right     Past Medical History:   Diagnosis Date     Allergic rhinitis      Anxiety      Arthritis      Asthma, mild intermittent      CARDIOVASCULAR SCREENING; LDL GOAL LESS THAN 130        CC Referred Self, MD  No address on file on close of this encounter.

## 2021-01-27 NOTE — PATIENT INSTRUCTIONS
Apply Amlactin to lower legs daily to help to thin out raised areas on legs    Around the nose and mouth, apply a thin layer of mupirocin twice per day     Continue to apply Elidel ointment to affected areas of psoriasis     Wash scalp with T/Sal shampoo 2-3x per week

## 2021-01-27 NOTE — PROGRESS NOTES
[facility] Dermatology Note  Encounter Date: Jan 27, 2021  Office Visit     Dermatology Problem List:  1. 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 Dysplastic nevi  - L lower lateral abdomen, compound dysplastic nevus with moderate atypia, s/p shave bx 1/15/18  3. Psoriasis  - Elidel 1% cream  4. Stucco keratosis, b/l lower extremities  - Amlactin daily  5. Scalp pruritus and seb derm  - T/Sal shampoo daily   - TAC 0.1% spray daily PRN      ____________________________________________    Assessment & Plan:     # Multiple clinically benign nevi on the back, chest, and extremities in sun exposed areas  - No concerning lesions present on examination today  - Sun protection: Counseled SPF30+ sunscreen, UPF clothing, sun avoidance, tanning bed avoidance.     # Psoriasis, well controlled with gentle skin care and Elidel 1% cream  - Continue to apply Elidel 1% cream   - Moisturize: Recommend good OTC moisturizer to full body, such as Cera-Ve, Janelle-cream, or Cetaphil. Advised best to apply after bathing to lock in moisture.    # Stucco keratosis, b/l lower extremities  - Apply Amlactin to the b/l lower extremities daily    # Superficial erosion on the columella and R lateral oral commisure  - Recommended application of mupirocin 2% ointment to affected areas of involvement BID    # Scalp pruritus with concomitant seb derm  - Recommended washing scalp with T/sal shampoo 2-3x per week  - Continue to apply TAC 0.1% spray to scalp daily PRN for pruritus      Procedures Performed:   None    Follow-up: 1 year(s) in-person, or earlier for new or changing lesions    Staff and Resident:     Olivia Bray MD  PGY-3 Dermatology Resident   I, Renetta Cardona MD, saw this patient with the resident and agree with the resident s findings and plan of care as documented in the resident s  note.    ____________________________________________    CC: Skin Check (shweta is coming in today for a skin check)    HPI:  Ms. Shweta Roberts is a(n) 50 year old female who presents today as a return patient for a skin check and follow-up of her psoriasis.  She was last seen in clinic on 6/11/19 at which time no lesions of concern were present. She was experiencing a mild dermatitis on around her R nare that she was recommended to treat with mupirocin ointment.  Since her last visit, she states that she has developed a mild flare a rash around her ears and scalp.  The rash around her ears has improved with application of Elidel 1%cream and she had a friend of hers who is a physician refill her rx for TAC 0.1% spray for her scalp dermatitis and pruritus. With the application of both of these products, her scalp pruritus and dermatitis around her ears has significantly improved. She denies any new, non-healing or tender lesions of concern that she would like to address today.    Patient is otherwise feeling well, without additional concerns.    Labs:  NA    Physical Exam:  Vitals: There were no vitals taken for this visit.  SKIN: Full skin, which includes the head/face, both arms, chest, back, abdomen,both legs, buttocks, digits and/or nails, was examined  - There are dome shaped bright red papules on the chest and back.   -There are thin, pink scaly patches and plaques behind the b/l postauricular neck  -Multiple regular brown pigmented macules and papules are identified on the chest, back, b/l upper and lower extremities.   -There is macular erythema of the scalp with mild flaky white scale.  -Scattered brown macules on sun exposed areas.  -There is a waxy stuck on tan to brown papule on the b/l lower extremities and back.  -There is xerosis of the b/l lower extremities.   - No other lesions of concern on areas examined.     Medications:  Current Outpatient Medications   Medication     albuterol (PROAIR HFA,  PROVENTIL HFA, VENTOLIN HFA) 108 (90 BASE) MCG/ACT inhaler     fexofenadine (ALLEGRA) 180 MG tablet     LORazepam (ATIVAN) 0.5 MG tablet     mupirocin (BACTROBAN) 2 % external ointment     norethindrone-ethinyl estradiol (MICROGESTIN) 1.5-30 MG-MCG per tablet     pimecrolimus (ELIDEL) 1 % cream     No current facility-administered medications for this visit.       Past Medical History:   Patient Active Problem List   Diagnosis     Anxiety     Asthma, mild intermittent     Allergic rhinitis     Knee pain     Hamstring muscle strain     Pain in joint involving ankle and foot     Psoriasis     Neoplasm of uncertain behavior of skin     S/P LEEP of cervix     Impingement syndrome of shoulder region, right     Past Medical History:   Diagnosis Date     Allergic rhinitis      Anxiety      Arthritis      Asthma, mild intermittent      CARDIOVASCULAR SCREENING; LDL GOAL LESS THAN 130        CC Referred Self, MD  No address on file on close of this encounter.

## 2021-01-27 NOTE — NURSING NOTE
Dermatology Rooming Note    Shweta Roberts's goals for this visit include:   Chief Complaint   Patient presents with     Skin Check     shweta is coming in today for a skin check     Cherelle Salcedo CMA on 1/27/2021 at 9:20 AM

## 2021-03-17 ENCOUNTER — IMMUNIZATION (OUTPATIENT)
Dept: NURSING | Facility: CLINIC | Age: 51
End: 2021-03-17
Payer: COMMERCIAL

## 2021-03-17 PROCEDURE — 91300 PR COVID VAC PFIZER DIL RECON 30 MCG/0.3 ML IM: CPT

## 2021-03-17 PROCEDURE — 0001A PR COVID VAC PFIZER DIL RECON 30 MCG/0.3 ML IM: CPT

## 2021-04-07 ENCOUNTER — IMMUNIZATION (OUTPATIENT)
Dept: NURSING | Facility: CLINIC | Age: 51
End: 2021-04-07
Attending: INTERNAL MEDICINE
Payer: COMMERCIAL

## 2021-04-07 PROCEDURE — 0002A PR COVID VAC PFIZER DIL RECON 30 MCG/0.3 ML IM: CPT

## 2021-04-07 PROCEDURE — 91300 PR COVID VAC PFIZER DIL RECON 30 MCG/0.3 ML IM: CPT

## 2022-02-14 ENCOUNTER — OFFICE VISIT (OUTPATIENT)
Dept: DERMATOLOGY | Facility: CLINIC | Age: 52
End: 2022-02-14
Payer: COMMERCIAL

## 2022-02-14 DIAGNOSIS — L21.9 SEBORRHEIC DERMATITIS: ICD-10-CM

## 2022-02-14 DIAGNOSIS — D22.9 MULTIPLE BENIGN NEVI: Primary | ICD-10-CM

## 2022-02-14 DIAGNOSIS — L81.4 SOLAR LENTIGO: ICD-10-CM

## 2022-02-14 DIAGNOSIS — L40.9 PSORIASIS: ICD-10-CM

## 2022-02-14 DIAGNOSIS — L57.8 PHOTOAGING OF SKIN: ICD-10-CM

## 2022-02-14 DIAGNOSIS — D18.01 CHERRY ANGIOMA: ICD-10-CM

## 2022-02-14 DIAGNOSIS — L82.1 SEBORRHEIC KERATOSES: ICD-10-CM

## 2022-02-14 DIAGNOSIS — L85.3 XEROSIS CUTIS: ICD-10-CM

## 2022-02-14 DIAGNOSIS — D48.5 NEOPLASM OF UNCERTAIN BEHAVIOR OF SKIN: ICD-10-CM

## 2022-02-14 PROCEDURE — 11102 TANGNTL BX SKIN SINGLE LES: CPT | Mod: GC | Performed by: DERMATOLOGY

## 2022-02-14 PROCEDURE — 99214 OFFICE O/P EST MOD 30 MIN: CPT | Mod: 25 | Performed by: DERMATOLOGY

## 2022-02-14 PROCEDURE — 88305 TISSUE EXAM BY PATHOLOGIST: CPT | Mod: 26 | Performed by: PATHOLOGY

## 2022-02-14 PROCEDURE — 88305 TISSUE EXAM BY PATHOLOGIST: CPT | Mod: TC | Performed by: DERMATOLOGY

## 2022-02-14 RX ORDER — CHOLECALCIFEROL (VITAMIN D3) 50 MCG
1 TABLET ORAL DAILY
COMMUNITY
End: 2023-05-01

## 2022-02-14 RX ORDER — ZINC GLUCONATE 50 MG
50 TABLET ORAL DAILY
COMMUNITY
End: 2023-05-01

## 2022-02-14 RX ORDER — RIBOFLAVIN (VITAMIN B2) 100 MG
100 TABLET ORAL 3 TIMES DAILY
COMMUNITY
End: 2023-05-01

## 2022-02-14 ASSESSMENT — PAIN SCALES - GENERAL: PAINLEVEL: NO PAIN (0)

## 2022-02-14 NOTE — LETTER
2/14/2022       RE: Shweta Roberts  [address]  [facility] [address]     Dear Colleague,    Thank you for referring your patient, Shweta Roberts, to the [facility] DERMATOLOGY [facility]IN[facility] [place] [facility]. Please see a copy of my visit note below.    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  -ISK, L vertex scalp, s/p shave bx 1/15/18  # Dysplastic nevi  - L lower lateral abdomen, compound dysplastic nevus with moderate atypia, s/p shave bx 1/15/18  # Psoriasis  - Elidel 1% cream  # Stucco keratosis, b/l lower extremities  - Amlactin daily  # Scalp pruritus and seb derm  - T/Sal shampoo daily   - TAC 0.1% spray daily PRN    ____________________________________________    Assessment & Plan:   # Neoplasm of uncertain behavior, left upper back s/p shave bx 02/14/22  DDx [facility] vs [facility] vs other    # Psoriasis, <1% BSA without PsA, well controlled   - Continue Elidel cream and hydrocortisone cream as needed    # Xerosis cutis  - Encouraged liberal use of Amlactin  - Dry skin care handout provided    # Right ear lobe dermatitis/fissuring  - Continue hydrocortisone 2 times daily as needed    # Scalp pruritus with concomitant seb derm  - Recommended washing scalp with T/sal shampoo 2-3x per week  - Continue to apply TAC 0.1% spray to scalp daily PRN for pruritus    # Benign skin findings  - Physical exam demonstrating benign skin findings as below.  - Seborrheic keratoses  - Cherry hemangiomas  - Solar lentingos   - Benign nevi  - Reassurance provided.  - ABCDEs of melanoma handout provided.  - Advised patient to return to clinic for any new or concerning lesions.      Procedures Performed:   - Shave biopsy procedure note,  location(s): left upper back. After discussion of benefits and risks including but not limited to bleeding, infection, scar, incomplete removal, recurrence, and non-diagnostic biopsy, written consent and photographs were obtained. The area was cleaned with isopropyl alcohol. 0.5mL of 1% lidocaine with epinephrine was injected to obtain adequate anesthesia of lesion(s). Shave biopsy at site(s) performed. Hemostasis was achieved with aluminium chloride. Petrolatum ointment and a sterile dressing were applied. The patient tolerated the procedure and no complications were noted. The patient was provided with verbal and written post care instructions.     Follow-up: 1 year, prn for new or changing lesions    Staff and Resident:     Abner Brantley MD  PGY-2 Dermatology  Pager: [phone]  I was present for key portions of the procedure. Renetta Cardona MD  I, Renetta Cardona MD, saw this patient with the resident and agree with the resident s findings and plan of care as documented in the resident s note.    ____________________________________________    CC: Skin Check (Shweta is here full body skin check, wants to look at right ear)    HPI:  Ms. Shweta Roberts is a(n) 51 year old female who presents today as a return patient for skin check.    - Last seen in clinic 1/27/21 for skin check, noted to be doing well without lesions of concern. Recommended Amlactin to lower extremities for stucco keratoses, mupirocin for erosions on oral commissure, and continuation of Elidel and T/sal shampoo and TAC 0.1% spray for psoriasis and seb derm, respectively.   -Patient notes no lesions of concern today, nothing bleeding, growing, or tender.  Wear sunscreen when outside and has an upcoming trip to [place] for 12 days.  Notes that her psoriasis is otherwise well controlled, no morning stiffness or joint pains.  Seborrheic dermatitis is well controlled using T-Gel shampoo 1-3 times weekly as needed as well as the triamcinolone spray.   Notes that her right earlobe has been slightly more irritated over the past couple weeks, has been using the hydrocortisone ointment with improvement.  Also continues apply AmLactin on lower extremities for dry skin.  - Patient is otherwise feeling well, without additional skin concerns.    Labs Reviewed:  N/A    Physical Exam:  Vitals: There were no vitals taken for this visit.  SKIN: Total skin excluding the undergarment areas was performed. The exam included the head/face, neck, both arms, chest, back, abdomen, both legs, digits and/or nails.   - Approximately 9mm pearly pink papule with arborizing vessels on dermoscopy  - Right ear lobe with xerotic mildly erythematous and fissured plaque    - Dome shaped bright red papules on the trunk and extremities.   - There are fine lines and dyspigmentation on sun exposed areas of the face and chest.  - Scattered brown macules on sun exposed areas.  - Light to dark brown symmetric macules and papules with normal pigment networks on dermoscopy on trunk and extremities  - No other lesions of concern on areas examined.             Medications:  Current Outpatient Medications   Medication     fexofenadine (ALLEGRA) 180 MG tablet     LORazepam (ATIVAN) 0.5 MG tablet     mupirocin (BACTROBAN) 2 % external ointment     norethindrone-ethinyl estradiol (MICROGESTIN) 1.5-30 MG-MCG per tablet     pimecrolimus (ELIDEL) 1 % external cream     albuterol (PROAIR HFA, PROVENTIL HFA, VENTOLIN HFA) 108 (90 BASE) MCG/ACT inhaler     No current facility-administered medications for this visit.      Past Medical History:   Patient Active Problem List   Diagnosis     Anxiety     Asthma, mild intermittent     Allergic rhinitis     Knee pain     Hamstring muscle strain     Pain in joint involving ankle and foot     Psoriasis     Neoplasm of uncertain behavior of skin     S/P LEEP of cervix     Impingement syndrome of shoulder region, right     Past Medical History:   Diagnosis Date     Allergic  rhinitis      Anxiety      Arthritis      Asthma, mild intermittent      CARDIOVASCULAR SCREENING; LDL GOAL LESS THAN 130        CC Referred Self  No address on file on close of this encounter.      Again, thank you for allowing me to participate in the care of your patient.      Sincerely,    Renetta Cardona MD

## 2022-02-14 NOTE — LETTER
Date:February 14, 2022      Provider requested that no letter be sent. Do not send.       [facility]

## 2022-02-14 NOTE — PATIENT INSTRUCTIONS
Patient Education     Checking for Skin Cancer  You can find cancer early by checking your skin each month. There are 3 kinds of skin cancer. They are melanoma, basal cell carcinoma, and squamous cell carcinoma. Doing monthly skin checks is the best way to find new marks or skin changes. Follow the instructions below for checking your skin.   The ABCDEs of checking moles for melanoma   Check your moles or growths for signs of melanoma using ABCDE:     Asymmetry: the sides of the mole or growth don t match    Border: the edges are ragged, notched, or blurred    Color: the color within the mole or growth varies    Diameter: the mole or growth is larger than 6 mm (size of a pencil eraser)    Evolving: the size, shape, or color of the mole or growth is changing (evolving is not shown in the images below)    Checking for other types of skin cancer  Basal cell carcinoma or squamous cell carcinoma have symptoms such as:       A spot or mole that looks different from all other marks on your skin    Changes in how an area feels, such as itching, tenderness, or pain    Changes in the skin's surface, such as oozing, bleeding, or scaliness    A sore that does not heal    New swelling or redness beyond the border of a mole    Who s at risk?  Anyone can get skin cancer. But you are at greater risk if you have:     Fair skin, light-colored hair, or light-colored eyes    Many moles or abnormal moles on your skin    A history of sunburns from sunlight or tanning beds    A family history of skin cancer    A history of exposure to radiation or chemicals    A weakened immune system  If you have had skin cancer in the past, you are at risk for recurring skin cancer.   How to check your skin  Do your monthly skin checkups in front of a full-length mirror. Check all parts of your body, including your:     Head (ears, face, neck, and scalp)    Torso (front, back, and sides)    Arms (tops, undersides, upper, and lower armpits)    Hands  (palms, backs, and fingers, including under the nails)    Buttocks and genitals    Legs (front, back, and sides)    Feet (tops, soles, toes, including under the nails, and between toes)  If you have a lot of moles, take digital photos of them each month. Make sure to take photos both up close and from a distance. These can help you see if any moles change over time.   Most skin changes are not cancer. But if you see any changes in your skin, call your doctor right away. Only he or she can diagnose a problem. If you have skin cancer, seeing your doctor can be the first step toward getting the treatment that could save your life.   [employer] last reviewed this educational content on 4/1/2019 2000-2020 [employer]. [address] [place] [address]. All rights reserved. This information is not intended as a substitute for professional medical care. Always follow your healthcare professional's instructions.       When should I call my doctor?    If you are worsening or not improving, please, contact us or seek urgent care as noted below.     Who should I call with questions (adults)?    [facility] (adult and pediatric): [phone]    [facility] (adult): [phone]    For urgent needs outside of business hours call the [facility] at [phone] and ask for the dermatology resident on call to be paged    If this is a medical emergency and you are unable to reach an ER, Call [phone]    Who should I call with questions (pediatric)?  [facility]- Pediatric Dermatology  Dr. Cristela Pineda, Dr. Adenike Rao, Dr. Alyce Tejada, ANDREW Porter, Dr. Susan Pope, Dr. Linda Zhang & Dr. José Gaston  Non-urgent nurse triage line; [phone]- Fatuma and Rena SOTO Care Coordinatorjaime Parra (/Complex ) [phone]    If you need a prescription refill, please contact your  pharmacy. Refills are approved or denied by our Physicians during normal business hours, Monday through Fridays  Per office policy, refills will not be granted if you have not been seen within the past year (or sooner depending on your child's condition)    Scheduling Information:  Pediatric Appointment Scheduling and Call Center [phone]  Radiology Scheduling- [phone]  Sedation Unit Scheduling- [phone]  [place] Scheduling- General [phone]; Pediatric Dermatology [phone]  Main  Services: [phone]  Northern Irish: [phone]  Tongan: [phone]  Hmong/British/Nilesh: [phone]  Preadmission Nursing Department Fax Number: [phone] (Fax all pre-operative paperwork to this number)    For urgent matters arising during evenings, weekends, or holidays that cannot wait for normal business hours please call [phone] and ask for the dermatology resident on call to be paged.  Wound Care After a Biopsy    What is a skin biopsy?  A skin biopsy allows the doctor to examine a very small piece of tissue under the microscope to determine the diagnosis and the best treatment for the skin condition. A local anesthetic (numbing medicine)  is injected with a very small needle into the skin area to be tested. A small piece of skin is taken from the area. Sometimes a suture (stitch) is used.     What are the risks of a skin biopsy?  I will experience scar, bleeding, swelling, pain, crusting and redness. I may experience incomplete removal or recurrence. Risks of this procedure are excessive bleeding, bruising, infection, nerve damage, numbness, thick (hypertrophic or keloidal) scar and non-diagnostic biopsy.    How should I care for my wound for the first 24 hours?    Keep the wound dry and covered for 24 hours    If it bleeds, hold direct pressure on the area for 15 minutes. If bleeding does not stop then go to the emergency room    Avoid strenuous exercise the first 1-2 days or as your  doctor instructs you    How should I care for the wound after 24 hours?    After 24 hours, remove the bandage    You may bathe or shower as normal  If you had a scalp biopsy, you can shampoo as usual and can use shower water to clean the biopsy site dailyDry Skin    What is dry skin?  Common skin problem  Can be worse during the winter   Affects all ages  Occurs in people with or without other skin problems    What does it look like?  Fine lines in the skin become more visible   Rough feeling skin   Flaky skin  Most common on the arms and legs  Skin can become cracked, especially on the hands and feet    What are some problems caused by dry skin?   Itching  Rubbing or scratching can cause thickened, rough skin patches  Cracks in skin can be painful  Red, itchy, scaly skin (called eczema) can occur  Yellow crusting or pus could be signs of an infection    What causes dry skin?  A lack of water in the top layer of the skin  Too much soapy water,  hot water, or harsh chemicals  Aging and sun damage    How do I treat dry skin?  Shower or bathe daily for under ten minutes with lukewarm water and mild soap.  Pat yourself dry with a towel gently and leave your skin slightly damp.  Use moisturizing cream or ointment right away.  Avoid lotions.    What kind of mild soap should I be using?  Camay , Dove , Tone , Neutrogena , Purpose , or Oil of Olay   A non-detergent cleanser, like Cetaphil , can be used.    What should I stay away from?  Scented soaps   Bath oils    What moisturizers should I be using?  Cetaphil Cream,CeraVe Cream, Vanicream, Aquaphilic, Eucerin, Aquaphor, or Vaseline   Always apply after showering or bathing.  Reapply throughout the day, if possible.  If dry skin affects your hands, always reapply after handwashing.    What else should I know?  Using a humidifier during winter months may help.    If dry skin gets worse or if eczema develops, a steroid cream may be needed.                  Clean the wound  twice a day with gentle soap and water    Do not scrub, be gentle    Apply white petroleum/Vaseline after cleaning the wound with a cotton swab or a clean finger, and keep the site covered with a Bandaid /bandage. Bandages are not necessary with a scalp biopsy    If you are unable to cover the site with a Bandaid /bandage, re-apply ointment 2-3 times a day to keep the site moist. Moisture will help with healing    Avoid strenuous activity for first 1-2 days    Avoid lakes, rivers, pools, and oceans until the stitches are removed or the site is healed    How do I clean my wound?    Wash hands thoroughly with soap or use hand  before all wound care    Clean the wound with gentle soap and water    Apply white petroleum/Vaseline  to wound after it is clean    Replace the Bandaid /bandage to keep the wound covered for the first few days or as instructed by your doctor    If you had a scalp biopsy, warm shower water to the area on a daily basis should suffice    What should I use to clean my wound?     Cotton-tipped applicators (Qtips )    White petroleum jelly (Vaseline ). Use a clean new container and use Q-tips to apply.    Bandaids   as needed    Gentle soap     How should I care for my wound long term?    Do not get your wound dirty    Keep up with wound care for one week or until the area is healed.    A small scab will form and fall off by itself when the area is completely healed. The area will be red and will become pink in color as it heals. Sun protection is very important for how your scar will turn out. Sunscreen with an SPF 30 or greater is recommended once the area is healed.    You should have some soreness but it should be mild and slowly go away over several days. Talk to your doctor about using tylenol for pain,    When should I call my doctor?  If you have increased:     Pain or swelling    Pus or drainage (clear or slightly yellow drainage is ok)    Temperature over 100F    Spreading redness  or warmth around wound    When will I hear about my results?  The biopsy results can take 2 weeks to come back.  Your results will automatically release to [employer] before your provider has even reviewed them.  The clinic will call you with the results, send you a [employer] message, or have you schedule a follow-up clinic or phone time to discuss the results.  Contact our clinics if you do not hear from us in 2 weeks.    Who should I call with questions?    [facility]: [phone]    [facility]: [phone]    For urgent needs outside of business hours call the [facility] at [phone] and ask for the dermatology resident on call

## 2022-02-14 NOTE — NURSING NOTE
Dermatology Rooming Note    Shweta Roberts's goals for this visit include:   Chief Complaint   Patient presents with     Skin Check     Shweta is here full body skin check, wants to look at right ear     Samantha Owens, EMT

## 2022-02-14 NOTE — NURSING NOTE
Lidocaine-epinephrine 1-1:188158 % injection   1mL once for one use, starting 2/14/2022 ending 2/14/2022,  2mL disp, R-0, injection  Injected by Dr. Abner Brantley

## 2022-02-14 NOTE — PROGRESS NOTES
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  -ISK, L vertex scalp, s/p shave bx 1/15/18  # Dysplastic nevi  - L lower lateral abdomen, compound dysplastic nevus with moderate atypia, s/p shave bx 1/15/18  # Psoriasis  - Elidel 1% cream  # Stucco keratosis, b/l lower extremities  - Amlactin daily  # Scalp pruritus and seb derm  - T/Sal shampoo daily   - TAC 0.1% spray daily PRN    ____________________________________________    Assessment & Plan:   # Neoplasm of uncertain behavior, left upper back s/p shave bx 02/14/22  DDx BCC vs NMSC vs other    # Psoriasis, <1% BSA without PsA, well controlled   - Continue Elidel cream and hydrocortisone cream as needed    # Xerosis cutis  - Encouraged liberal use of Amlactin  - Dry skin care handout provided    # Right ear lobe dermatitis/fissuring  - Continue hydrocortisone 2 times daily as needed    # Scalp pruritus with concomitant seb derm  - Recommended washing scalp with T/sal shampoo 2-3x per week  - Continue to apply TAC 0.1% spray to scalp daily PRN for pruritus    # Benign skin findings  - Physical exam demonstrating benign skin findings as below.  - Seborrheic keratoses  - Cherry hemangiomas  - Solar lentingos   - Benign nevi  - Reassurance provided.  - ABCDEs of melanoma handout provided.  - Advised patient to return to clinic for any new or concerning lesions.      Procedures Performed:   - Shave biopsy procedure note, location(s): left upper back. After discussion of benefits and risks including but not limited to bleeding, infection, scar, incomplete removal, recurrence, and non-diagnostic biopsy, written consent and photographs were obtained. The area was cleaned with isopropyl alcohol. 0.5mL of 1% lidocaine with epinephrine was  injected to obtain adequate anesthesia of lesion(s). Shave biopsy at site(s) performed. Hemostasis was achieved with aluminium chloride. Petrolatum ointment and a sterile dressing were applied. The patient tolerated the procedure and no complications were noted. The patient was provided with verbal and written post care instructions.     Follow-up: 1 year, prn for new or changing lesions    Staff and Resident:     Abner Brantley MD  PGY-2 Dermatology  Pager: [phone]  I was present for key portions of the procedure. Renetta Cardona MD  I, Renetta Cardona MD, saw this patient with the resident and agree with the resident s findings and plan of care as documented in the resident s note.    ____________________________________________    CC: Skin Check (Shweta is here full body skin check, wants to look at right ear)    HPI:  Ms. Shweta Roberts is a(n) 51 year old female who presents today as a return patient for skin check.    - Last seen in clinic 1/27/21 for skin check, noted to be doing well without lesions of concern. Recommended Amlactin to lower extremities for stucco keratoses, mupirocin for erosions on oral commissure, and continuation of Elidel and T/sal shampoo and TAC 0.1% spray for psoriasis and seb derm, respectively.   -Patient notes no lesions of concern today, nothing bleeding, growing, or tender.  Wear sunscreen when outside and has an upcoming trip to [place] for 12 days.  Notes that her psoriasis is otherwise well controlled, no morning stiffness or joint pains.  Seborrheic dermatitis is well controlled using T-Gel shampoo 1-3 times weekly as needed as well as the triamcinolone spray.  Notes that her right earlobe has been slightly more irritated over the past couple weeks, has been using the hydrocortisone ointment with improvement.  Also continues apply AmLactin on lower extremities for dry skin.  - Patient is otherwise feeling well, without additional skin concerns.    Labs  Reviewed:  N/A    Physical Exam:  Vitals: There were no vitals taken for this visit.  SKIN: Total skin excluding the undergarment areas was performed. The exam included the head/face, neck, both arms, chest, back, abdomen, both legs, digits and/or nails.   - Approximately 9mm pearly pink papule with arborizing vessels on dermoscopy  - Right ear lobe with xerotic mildly erythematous and fissured plaque    - Dome shaped bright red papules on the trunk and extremities.   - There are fine lines and dyspigmentation on sun exposed areas of the face and chest.  - Scattered brown macules on sun exposed areas.  - Light to dark brown symmetric macules and papules with normal pigment networks on dermoscopy on trunk and extremities  - No other lesions of concern on areas examined.             Medications:  Current Outpatient Medications   Medication     fexofenadine (ALLEGRA) 180 MG tablet     LORazepam (ATIVAN) 0.5 MG tablet     mupirocin (BACTROBAN) 2 % external ointment     norethindrone-ethinyl estradiol (MICROGESTIN) 1.5-30 MG-MCG per tablet     pimecrolimus (ELIDEL) 1 % external cream     albuterol (PROAIR HFA, PROVENTIL HFA, VENTOLIN HFA) 108 (90 BASE) MCG/ACT inhaler     No current facility-administered medications for this visit.      Past Medical History:   Patient Active Problem List   Diagnosis     Anxiety     Asthma, mild intermittent     Allergic rhinitis     Knee pain     Hamstring muscle strain     Pain in joint involving ankle and foot     Psoriasis     Neoplasm of uncertain behavior of skin     S/P LEEP of cervix     Impingement syndrome of shoulder region, right     Past Medical History:   Diagnosis Date     Allergic rhinitis      Anxiety      Arthritis      Asthma, mild intermittent      CARDIOVASCULAR SCREENING; LDL GOAL LESS THAN 130        CC Referred Self  No address on file on close of this encounter.

## 2022-02-15 DIAGNOSIS — C44.519 BCC (BASAL CELL CARCINOMA), BACK: Primary | ICD-10-CM

## 2022-02-15 LAB
PATH REPORT.COMMENTS IMP SPEC: NORMAL
PATH REPORT.COMMENTS IMP SPEC: NORMAL
PATH REPORT.FINAL DX SPEC: NORMAL
PATH REPORT.GROSS SPEC: NORMAL
PATH REPORT.MICROSCOPIC SPEC OTHER STN: NORMAL
PATH REPORT.RELEVANT HX SPEC: NORMAL

## 2022-02-16 NOTE — TELEPHONE ENCOUNTER
FUTURE VISIT INFORMATION      FUTURE VISIT INFORMATION:    Date: 3.8.22    Time: 1:30    Location: Telephone  REFERRAL INFORMATION:    Referring provider:  Dr. Cardona    Referring providers clinic:  [facility]    Reason for visit/diagnosis  bcc of back    RECORDS REQUESTED FROM:       Clinic name Comments Records Status Photos Status   [facility] 2.14.22  Path # [id number] Epic Epic

## 2022-03-08 ENCOUNTER — PRE VISIT (OUTPATIENT)
Dept: DERMATOLOGY | Facility: CLINIC | Age: 52
End: 2022-03-08

## 2022-03-08 ENCOUNTER — VIRTUAL VISIT (OUTPATIENT)
Dept: DERMATOLOGY | Facility: CLINIC | Age: 52
End: 2022-03-08
Payer: COMMERCIAL

## 2022-03-08 DIAGNOSIS — C44.519 BASAL CELL CARCINOMA OF UPPER BACK: Primary | ICD-10-CM

## 2022-03-08 PROCEDURE — 99213 OFFICE O/P EST LOW 20 MIN: CPT | Mod: 95 | Performed by: DERMATOLOGY

## 2022-03-08 ASSESSMENT — PAIN SCALES - GENERAL: PAINLEVEL: NO PAIN (0)

## 2022-03-08 NOTE — LETTER
3/8/2022       RE: Shweta Roberts  [address]  [place] [facility] [address]     Dear Colleague,    Thank you for referring your patient, Shweta Roberts, to the [facility] DERMATOLOGIC SURGERY CLINIC [place] at [facility]. Please see a copy of my visit note below.    [facility] Dermatology Note  Encounter Date: Mar 8, 2022  Store-and-Forward and Telephone (seel cell phone). Location of teledermatologist: [facility] DERMATOLOG[facility] SURGERY CLINIC [place].  Start time: 1343. End time: 1351.    Dermatology Problem List:  1. BCC, L upper back, excision scheduled 4/5/22    ____________________________________________    Assessment & Plan:     # BCC, L upper back, excision scheduled 4/5/22  - The nature, risks, benefits, and alternatives to excision surgery were discussed. The patient would like to proceed with excision surgery.  - She does not take anticoagulants.       Staff:     Timur Benton DO    Department of Dermatology  [facility]: Phone: [phone], Fax:[phone]  [facility]: Phone: [phone], Fax: [phone]    ____________________________________________    CC: Derm Problem (BCC of the back )    HPI:  Ms. Shweta Roberts is a(n) 51 year old female who presents today as a return patient for treatment consultation for basal cell carcinoma of the left upper back.  She has a family history of basal cell skin cancer.  She has witnessed skin surgery and healing skin.  This is her first basal cell carcinoma.     Patient is otherwise feeling well, without additional skin concerns.    Labs Reviewed:  Derm path report 2/14/2022 reviewed; left upper back, basal cell carcinoma, nodular type, extending to deep margin.     Physical Exam:  Vitals: There were no vitals taken for this visit.  SKIN:  Teledermatology photos were reviewed; image quality and interpretability: acceptable. Image date: 2/14/2022.  -Approximately 1 cm pink pearly papule left upper back.   - No other lesions of concern on areas examined.     Medications:  Current Outpatient Medications   Medication     fexofenadine (ALLEGRA) 180 MG tablet     LORazepam (ATIVAN) 0.5 MG tablet     mupirocin (BACTROBAN) 2 % external ointment     norethindrone-ethinyl estradiol (MICROGESTIN) 1.5-30 MG-MCG per tablet     pimecrolimus (ELIDEL) 1 % external cream     vitamin C (ASCORBIC ACID) 100 MG tablet     vitamin D3 (CHOLECALCIFEROL) 50 mcg (2000 units) tablet     zinc gluconate 50 MG tablet     albuterol (PROAIR HFA, PROVENTIL HFA, VENTOLIN HFA) 108 (90 BASE) MCG/ACT inhaler     No current facility-administered medications for this visit.      Past Medical/Surgical History:   Patient Active Problem List   Diagnosis     Anxiety     Asthma, mild intermittent     Allergic rhinitis     Knee pain     Hamstring muscle strain     Pain in joint involving ankle and foot     Psoriasis     Neoplasm of uncertain behavior of skin     S/P LEEP of cervix     Impingement syndrome of shoulder region, right     Past Medical History:   Diagnosis Date     Allergic rhinitis      Anxiety      Arthritis      Asthma, mild intermittent      CARDIOVASCULAR SCREENING; LDL GOAL LESS THAN 130        CC Renetta Cardona MD  [address] [facility] [address]  [place] [address] on close of this encounter.

## 2022-03-08 NOTE — NURSING NOTE
Dermatology Rooming Note    Shweta Roberts's goals for this visit include:   Chief Complaint   Patient presents with     Derm Problem     BCC of the back      Claire Garcia, ANAIS

## 2022-03-08 NOTE — PROGRESS NOTES
[facility] Dermatology Note  Encounter Date: Mar 8, 2022  Store-and-Forward and Telephone (seel cell phone). Location of teledermatologist: [facility] DERMATOLOG[facility] SURGERY CLINIC [place].  Start time: 1343. End time: 1351.    Dermatology Problem List:  1. BCC, L upper back, excision scheduled 4/5/22    ____________________________________________    Assessment & Plan:     # BCC, L upper back, excision scheduled 4/5/22  - The nature, risks, benefits, and alternatives to excision surgery were discussed. The patient would like to proceed with excision surgery.  - She does not take anticoagulants.       Staff:     Timur Benton DO    Department of Dermatology  [facility]: Phone: [phone], Fax:[phone]  [facility]: Phone: [phone], Fax: [phone]    ____________________________________________    CC: Derm Problem (BCC of the back )    HPI:  Ms. Shweta Roberts is a(n) 51 year old female who presents today as a return patient for treatment consultation for basal cell carcinoma of the left upper back.  She has a family history of basal cell skin cancer.  She has witnessed skin surgery and healing skin.  This is her first basal cell carcinoma.     Patient is otherwise feeling well, without additional skin concerns.    Labs Reviewed:  Derm path report 2/14/2022 reviewed; left upper back, basal cell carcinoma, nodular type, extending to deep margin.     Physical Exam:  Vitals: There were no vitals taken for this visit.  SKIN: Teledermatology photos were reviewed; image quality and interpretability: acceptable. Image date: 2/14/2022.  -Approximately 1 cm pink pearly papule left upper back.   - No other lesions of concern on areas examined.     Medications:  Current Outpatient Medications   Medication     fexofenadine (ALLEGRA) 180 MG tablet     LORazepam  (ATIVAN) 0.5 MG tablet     mupirocin (BACTROBAN) 2 % external ointment     norethindrone-ethinyl estradiol (MICROGESTIN) 1.5-30 MG-MCG per tablet     pimecrolimus (ELIDEL) 1 % external cream     vitamin C (ASCORBIC ACID) 100 MG tablet     vitamin D3 (CHOLECALCIFEROL) 50 mcg (2000 units) tablet     zinc gluconate 50 MG tablet     albuterol (PROAIR HFA, PROVENTIL HFA, VENTOLIN HFA) 108 (90 BASE) MCG/ACT inhaler     No current facility-administered medications for this visit.      Past Medical/Surgical History:   Patient Active Problem List   Diagnosis     Anxiety     Asthma, mild intermittent     Allergic rhinitis     Knee pain     Hamstring muscle strain     Pain in joint involving ankle and foot     Psoriasis     Neoplasm of uncertain behavior of skin     S/P LEEP of cervix     Impingement syndrome of shoulder region, right     Past Medical History:   Diagnosis Date     Allergic rhinitis      Anxiety      Arthritis      Asthma, mild intermittent      CARDIOVASCULAR SCREENING; LDL GOAL LESS THAN 130        CC Renetta Cardona MD  [address] [facility] [address]  [place] [address] on close of this encounter.

## 2022-03-11 ENCOUNTER — VIRTUAL VISIT (OUTPATIENT)
Dept: URGENT CARE | Facility: CLINIC | Age: 52
End: 2022-03-11
Payer: COMMERCIAL
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
## 2022-03-11 DIAGNOSIS — S01.531A PUNCTURE WOUND OF LIP, INITIAL ENCOUNTER: ICD-10-CM

## 2022-03-11 DIAGNOSIS — S00.81XA ABRASION OF FACE, INITIAL ENCOUNTER: ICD-10-CM

## 2022-03-11 DIAGNOSIS — S61.459A DOG BITE OF HAND, UNSPECIFIED LATERALITY, INITIAL ENCOUNTER: Primary | ICD-10-CM

## 2022-03-11 PROCEDURE — 99213 OFFICE O/P EST LOW 20 MIN: CPT | Mod: TEL | Performed by: PHYSICIAN ASSISTANT

## 2022-03-11 NOTE — PROGRESS NOTES
Shweta is a 51 year old who is being evaluated via a billable telephone visit.      Assessment & Plan     Dog bite of hand, unspecified laterality, initial encounter    Puncture wound of lip, initial encounter  Abrasion of face, initial encounter    Patient has a friend who is a medical provider that already sent in Bactrim and Clindamycin for prophylactic treatment of bite wounds. I discussed home care and bandaging as well as the need for follow up if she is having fevers, spreading redness or purulent or malodorous drainage.     Lizzy Bowers PA-C  Virtual Urgent Care  [facility] VIRTUAL URGENT CARE    Subjective   Shweta is a 51 year old who presents for the following health issues:     HPI -  Patient reports that her dog scratched her face and lip and bit her hand. Patient reports that the dog broke her leg and when she went to pick her up she panicked and clawed and bit her. She cleaned the areas with antibacterial solution and has used neosporin and bandages. She was told that she should not have stitches due to the injury being from a dog bite.     Review of Systems   Constitutional, HEENT, cardiovascular, pulmonary, gi and gu systems are negative, except as otherwise noted.      Objective           Vitals:  No vitals were obtained today due to virtual visit.    Physical Exam   GENERAL: Healthy, alert and no distress  SKIN: multiple abrasions and puncture wounds to the lower left lip as well as deep puncture of the hand ( photos sent by patient) There is swelling and early bruising to the lower lip.   PSYCH: Mentation appears normal, affect normal/bright, judgement and insight intact, normal speech and appearance well-groomed.    Time of call: 20 min

## 2022-04-05 ENCOUNTER — OFFICE VISIT (OUTPATIENT)
Dept: DERMATOLOGY | Facility: CLINIC | Age: 52
End: 2022-04-05
Attending: DERMATOLOGY
Payer: COMMERCIAL

## 2022-04-05 VITALS — HEART RATE: 79 BPM | DIASTOLIC BLOOD PRESSURE: 84 MMHG | SYSTOLIC BLOOD PRESSURE: 129 MMHG

## 2022-04-05 DIAGNOSIS — C44.519 BCC (BASAL CELL CARCINOMA), BACK: ICD-10-CM

## 2022-04-05 PROCEDURE — 88305 TISSUE EXAM BY PATHOLOGIST: CPT | Mod: 26 | Performed by: DERMATOLOGY

## 2022-04-05 PROCEDURE — 88305 TISSUE EXAM BY PATHOLOGIST: CPT | Mod: TC | Performed by: DERMATOLOGY

## 2022-04-05 PROCEDURE — 11603 EXC TR-EXT MAL+MARG 2.1-3 CM: CPT | Mod: 51 | Performed by: DERMATOLOGY

## 2022-04-05 PROCEDURE — 12032 INTMD RPR S/A/T/EXT 2.6-7.5: CPT | Performed by: DERMATOLOGY

## 2022-04-05 ASSESSMENT — PAIN SCALES - GENERAL: PAINLEVEL: NO PAIN (0)

## 2022-04-05 NOTE — PATIENT INSTRUCTIONS
Excision/Mohs Wound Care Instructions  I will experience scar, altered skin color, bleeding, swelling, pain, crusting and redness. I may experience altered sensation. Risks are excessive bleeding, infection, muscle weakness, thick (hypertrophic or keloidal) scar, and recurrence. A second procedure may be recommended to obtain the best cosmetic or functional result.  Possible complications of any surgical procedure are bleeding, infection, scarring, alteration in skin color and sensation, muscle weakness in the area, wound dehiscence or seperation, or recurrence of the lesion or disease. On occasion, after healing, a secondary procedure or revision may be recommended in order to obtain the best cosmetic or functional result.   After your surgery, a pressure bandage will be placed over the area that has sutures. This will help prevent bleeding. Please follow these instructions until you remove sutures in 14 days, as they will help you to prevent complications as your wound heals.  For the First 48 hours After Surgery:  1. Leave the pressure bandage on and keep it dry. If it should come loose, you may retape it, but do not take it off.  2. Relax and take it easy. Do not do any vigorous exercise, heavy lifting, or bending forward. This could cause the wound to bleed.  3. Post-operative pain is usually mild. You may take plain or extra strength Tylenol every 4 hours as needed (do not take more than 4,000mg in one day). Do not take any medicine that contains aspirin, ibuprofen or motrin unless you have been recommended these by a doctor.  Avoid alcohol and vitamin E as these may increase your tendency to bleed.  4. You may put an ice pack around the bandaged area for 20 minutes every 2-3 hours. This may help reduce swelling, bruising, and pain. Make sure the ice pack is waterproof so that the pressure bandage does not get wet.   5. You may see a small amount of drainage or blood on your pressure bandage. This is normal.  However, if drainage or bleeding continues or saturates the bandage, you will need to apply firm pressure over the bandage with a washcloth for 15 minutes. If bleeding continues after applying pressure for 15 minutes then go to the nearest emergency room.  48 Hours After Surgery  Carefully remove the bandage and start daily wound care and dressing changes. You may also now shower and get the wound wet.  Daily Wound Care:  1. Wash wound with a mild soap and water.  Use caution when washing the wound, be gentle and do not let the forceful shower stream hit the wound directly.  2. Pat dry.  3. Apply Vaseline (from a new container or tube) over the suture line with a Q-tip. It is very important to keep the wound continuously moist, as wounds heal best in a moist environment.  4. Keep the site covered until sutures are removed, you can cover it with a Telfa (non-stick) dressing and tape or a band-aid.    5. If you are unable to keep wound covered, you must apply Vaseline every 2-3 hours (while awake) to ensure it is being kept moist for optimal healing. A dressing overnight is recommended to keep the area moist.  Call Us If:  1. You have pain that is not controlled with Tylenol.  2. You have signs or symptoms of an infection, such as: fever over 100 degrees F, redness, warmth, or foul-smelling or yellow/creamy drainage from the wound.  Who should I call with questions?    [facility]: [phone]     [facility]: [phone]    For urgent needs outside of business hours call the [facility] at [phone] and ask to speak with the dermatology resident on call

## 2022-04-05 NOTE — PROGRESS NOTES
[facility] Dermatologic Surgery Excision Note    Dermatology Problem List:  1. BCC, L upper back, s/p excision 4/5/22     ____________________________________________      Case #: 1  Date of Service:  Apr 5, 2022  Surgery: Wide local excision  Staff Surgeon: Timur Benton DO  Resident Surgeon: None  Nurse: Mary Bobo CMA    Tumor Type: BCC  Location: L upper back  Derm-Path Accession #: [id number]       Skin Lesion Size:  1.3x1.2cm  Margin: 4 mm  Excision size: 2.1x2.0cm   Level of Defect: Fat  Repair Type: Intermediate linear  Repair Size: 4.5 cm  Suture Material: 4-0 Monocryl; 4-0 Prolene    INDICATIONS:  The patient was scheduled for wide local excision of the skin lesion documented above. We discussed the principles of treatment and most likely complications including scarring, bleeding, infection, incomplete excision, wound dehiscence, pain, nerve damage, and recurrence. Informed consent was obtained and the patient underwent the procedure as follows:    PROCEDURE:  With the patient in a prone position, the lesion was outlined with the margin documented above.  The lesion and the necessary margin (excised diameter) was measured and documented as above.  An ellipse was designed around the lesion to conform to relaxed skin tension lines in an effort to minimize scarring and deformity.  The lesion and surrounding skin were prepped with chlorhexidine, draped, and anesthetized with lidocaine with epinephrine. Using a #15 blade, the skin was excised along premarked lines.  The level of the defect is documented as above.  Wound margins were undermined to limit functional deformity/impairment of adjacent structures. Bleeding vessels were controlled with electrocoagulation.  The dermis and subcutaneous tissue were closed with buried vertical mattress sutures using 4-0 Monocryl.  Epidermal approximation was meticulously refined with 4-0 Prolene simple running sutures, resulting in a linear closure  with little to no wound tension.  Blood loss was estimated to be less than 5 mL.  The area was coated with petrolatum and covered with a non-adherent dressing followed by gauze and tape. Postoperative instructions were reviewed per protocol.  The patient left alert and fully oriented.    Follow-up for suture removal: 2 weeks    Scribe Disclosure:  I, Dahiana Joseph, am serving as a scribe to document services personally performed by Timur Benton MD based on data collection and the provider's statements to me.       Provider Disclosure:   The documentation recorded by the scribe accurately reflects the services I personally performed and the decisions made by me.  I personally performed the procedures today.    Timur Benton DO    Department of Dermatology  [facility]: Phone: [phone], Fax:[phone]  [facility]: Phone: [phone], Fax: [phone]

## 2022-04-05 NOTE — LETTER
4/5/2022       RE: Shweta Roberts  [address]  [facility] [address]     Dear Colleague,    Thank you for referring your patient, Shweta Roberts, to the [facility] DERMATOLOGIC SURGERY CLINIC [place] [facility]. Please see a copy of my visit note below.    [facility] Dermatologic Surgery Excision Note    Dermatology Problem List:  1. BCC, L upper back, s/p excision 4/5/22     ____________________________________________      Case #: 1  Date of Service:  Apr 5, 2022  Surgery: Wide local excision  Staff Surgeon: Timur Benton DO  Resident Surgeon: None  Nurse: Mary Bobo CMA    Tumor Type: BCC  Location: L upper back  Derm-Path Accession #: [id number]       Skin Lesion Size:  1.3x1.2cm  Margin: 4 mm  Excision size: 2.1x2.0cm   Level of Defect: Fat  Repair Type: Intermediate linear  Repair Size: 4.5 cm  Suture Material: 4-0 Monocryl; 4-0 Prolene    INDICATIONS:  The patient was scheduled for wide local excision of the skin lesion documented above. We discussed the principles of treatment and most likely complications including scarring, bleeding, infection, incomplete excision, wound dehiscence, pain, nerve damage, and recurrence. Informed consent was obtained and the patient underwent the procedure as follows:    PROCEDURE:  With the patient in a prone position, the lesion was outlined with the margin documented above.  The lesion and the necessary margin (excised diameter) was measured and documented as above.  An ellipse was designed around the lesion to conform to relaxed skin tension lines in an effort to minimize scarring and deformity.  The lesion and surrounding skin were prepped with chlorhexidine, draped, and anesthetized with lidocaine with epinephrine. Using a #15 blade, the skin was excised along premarked lines.  The level of the defect is documented as above.  Wound margins were undermined to limit functional  deformity/impairment of adjacent structures. Bleeding vessels were controlled with electrocoagulation.  The dermis and subcutaneous tissue were closed with buried vertical mattress sutures using 4-0 Monocryl.  Epidermal approximation was meticulously refined with 4-0 Prolene simple running sutures, resulting in a linear closure with little to no wound tension.  Blood loss was estimated to be less than 5 mL.  The area was coated with petrolatum and covered with a non-adherent dressing followed by gauze and tape. Postoperative instructions were reviewed per protocol.  The patient left alert and fully oriented.    Follow-up for suture removal: 2 weeks    Scribe Disclosure:  IDahiana, am serving as a scribe to document services personally performed by Timur Benton MD based on data collection and the provider's statements to me.       Provider Disclosure:   The documentation recorded by the scribe accurately reflects the services I personally performed and the decisions made by me.  I personally performed the procedures today.    Timur Benton DO    Department of Dermatology  [facility]: Phone: [phone], Fax:[phone]  [facility]: Phone: [phone], Fax: [phone]

## 2022-04-05 NOTE — Clinical Note
Hi Everyone,   I recommend this patient have another skin cancer screening visit in the next 6 months. Dr. Cardona's clinic did her last skin exam in February. Thank you.

## 2022-04-05 NOTE — NURSING NOTE
Chief Complaint   Patient presents with     Derm Problem     Shweta is here today for excision on her left upper back due to BCC     Marixa Copeland LPN

## 2022-06-06 ENCOUNTER — IMMUNIZATION (OUTPATIENT)
Dept: NURSING | Facility: CLINIC | Age: 52
End: 2022-06-06
Payer: COMMERCIAL

## 2022-06-06 PROCEDURE — 91305 COVID-19,PF,PFIZER (12+ YRS): CPT

## 2022-06-06 PROCEDURE — 0054A COVID-19,PF,PFIZER (12+ YRS): CPT

## 2022-08-17 ENCOUNTER — OFFICE VISIT (OUTPATIENT)
Dept: DERMATOLOGY | Facility: CLINIC | Age: 52
End: 2022-08-17
Payer: COMMERCIAL

## 2022-08-17 DIAGNOSIS — L82.1 SEBORRHEIC KERATOSES: ICD-10-CM

## 2022-08-17 DIAGNOSIS — Z85.828 HISTORY OF NONMELANOMA SKIN CANCER: ICD-10-CM

## 2022-08-17 DIAGNOSIS — L57.0 ACTINIC KERATOSIS: ICD-10-CM

## 2022-08-17 DIAGNOSIS — D22.9 MULTIPLE BENIGN NEVI: ICD-10-CM

## 2022-08-17 DIAGNOSIS — Z12.83 SCREENING EXAM FOR SKIN CANCER: Primary | ICD-10-CM

## 2022-08-17 DIAGNOSIS — L81.4 SOLAR LENTIGO: ICD-10-CM

## 2022-08-17 PROCEDURE — 17003 DESTRUCT PREMALG LES 2-14: CPT | Mod: GC | Performed by: DERMATOLOGY

## 2022-08-17 PROCEDURE — 99213 OFFICE O/P EST LOW 20 MIN: CPT | Mod: 25 | Performed by: DERMATOLOGY

## 2022-08-17 PROCEDURE — 17000 DESTRUCT PREMALG LESION: CPT | Mod: GC | Performed by: DERMATOLOGY

## 2022-08-17 ASSESSMENT — PAIN SCALES - GENERAL: PAINLEVEL: NO PAIN (0)

## 2022-08-17 NOTE — PATIENT INSTRUCTIONS
Cryotherapy    What is it?  Use of a very cold liquid, such as liquid nitrogen, to freeze and destroy abnormal skin cells that need to be removed    What should I expect?  Tenderness and redness  A small blister that might grow and fill with dark purple blood. There may be crusting.  More than one treatment may be needed if the lesions do not go away.    How do I care for the treated area?  Gently wash the area with your hands when bathing.  Use a thin layer of Vaseline to help with healing. You may use a Band-Aid.   The area should heal within 7-10 days and may leave behind a pink or lighter color.   Do not use an antibiotic or Neosporin ointment.   You may take acetaminophen (Tylenol) for pain.     Call your doctor if you have:  Severe pain  Signs of infection (warmth, redness, cloudy yellow drainage, and or a bad smell)  Questions or concerns    Who should I call with questions?      [facility]: [phone]      [facility]: [phone]      For urgent needs outside of business hours call the [facility] at [phone] and ask for the dermatology resident on call

## 2022-08-17 NOTE — NURSING NOTE
Chief Complaint   Patient presents with     Skin Check     Full body skin check. Areas of concern shoulders and lower lip.     Chente Wolf, EMT

## 2022-08-17 NOTE — PROGRESS NOTES
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  - ISK, L vertex scalp, s/p shave bx 1/15/18  # Dysplastic nevi  - L lower lateral abdomen, compound dysplastic nevus with moderate atypia, s/p shave bx 1/15/18  # Psoriasis  - Elidel 1% cream  # Stucco keratosis, b/l lower extremities  - Amlactin daily  # Scalp pruritus and seb derm  - T/Sal shampoo daily   - TAC 0.1% spray daily PRN    # AKs - cryo  - returning Winter 2022 for cryo of the nasal bridge  ____________________________________________    Assessment & Plan:     # Actinic keratosis  Discussed that these are pre-cancerous lesions and 10% of them will go on to become non-melanoma skin cancers over their next 10 years of life, which is why we opt treat them.   - Cryotherapy performed today to posterior shoulders only, see procedure note below  - Encouraged daily sun protection with SPF 30+ and UPF clothing  - Patient deferred cryotherapy of nose until the colder months when she is not outside as much    # Seborrheic keratoses  - Discussed the natural history and benign nature of these lesions. Reassurance provided that no additional treatment is necessary, however cryotherapy can be performed if inflamed/irritated or bothersome to patient.     # Multiple clinically benign nevi  - Discussed benign etiology of these lesions and reassurance provided  - ABCDE's of melanoma reviewed with patient  - Encouraged daily sun protection with SPF 30+ and UPF clothing    # History of non-melanoma skin cancer with no evidence of recurrence today  - Continue routine skin cancer screening exams  - Encouraged daily sun protection with SPF 30+ and UPF clothing  - Encouraged tanning bed avoidance      Procedures Performed:   - Cryotherapy procedure note, location(s): bilateral  posterior shoulders. After verbal consent and discussion of risks and benefits including, but not limited to, dyspigmentation/scar, blister, and pain, 2 lesion(s) was(were) treated with 1-2 mm freeze border for 1-2 cycles with liquid nitrogen. Post cryotherapy instructions were provided.    Follow-up: 6 month(s) in-person for treatment of AKs on the nose, or earlier for new or changing lesions    Staff and Resident:     Janie Jara DO PGY-4  Department of Dermatology  [facility]    Staff: Dr. Dulce OSBORNE was present for the entire procedure. Renetta RENTERIA I, Renetta Cardona MD, saw this patient with the resident and agree with the resident s findings and plan of care as documented in the resident s note.    ____________________________________________    CC: No chief complaint on file.    HPI:  Ms. Shweta Roberts is a(n) 51 year old female who presents today as a return patient for a skin check due to history of NMSC. Reports a few rough scaly spots on the back of her shoulders and on the bridge of her nose. Reports wearing SPF 50 when outdoors.    Patient is otherwise feeling well, without additional skin concerns.    Labs Reviewed:  N/A    Physical Exam:  Vitals: There were no vitals taken for this visit.  SKIN: Full skin, which includes the head/face, both arms, chest, back, abdomen,both legs, genitalia and/or groin buttocks, digits and/or nails, was examined.  - There are erythematous macules with overyling adherent scale on the dorsal nose and posterior shoulders.   - There are waxy stuck on tan to brown papules on the trunk and extremities.   - There is no erythema, telangectasias, nodularity, or pigmentation on the left posterior shoulder.  - Scattered brown macules on sun exposed areas of the face, trunk and extremities.  - No other lesions of concern on areas examined.     Medications:  Current Outpatient Medications   Medication     albuterol (PROAIR HFA, PROVENTIL HFA,  VENTOLIN HFA) 108 (90 BASE) MCG/ACT inhaler     fexofenadine (ALLEGRA) 180 MG tablet     LORazepam (ATIVAN) 0.5 MG tablet     mupirocin (BACTROBAN) 2 % external ointment     norethindrone-ethinyl estradiol (MICROGESTIN) 1.5-30 MG-MCG per tablet     pimecrolimus (ELIDEL) 1 % external cream     vitamin C (ASCORBIC ACID) 100 MG tablet     vitamin D3 (CHOLECALCIFEROL) 50 mcg (2000 units) tablet     zinc gluconate 50 MG tablet     No current facility-administered medications for this visit.      Past Medical History:   Patient Active Problem List   Diagnosis     Anxiety     Asthma, mild intermittent     Allergic rhinitis     Knee pain     Hamstring muscle strain     Pain in joint involving ankle and foot     Psoriasis     Neoplasm of uncertain behavior of skin     S/P LEEP of cervix     Impingement syndrome of shoulder region, right     Past Medical History:   Diagnosis Date     Allergic rhinitis      Anxiety      Arthritis      Asthma, mild intermittent      CARDIOVASCULAR SCREENING; LDL GOAL LESS THAN 130        CC Ligia Rome NP  [address] [place] [address] on close of this encounter.

## 2022-08-17 NOTE — LETTER
8/17/2022       RE: Shweta Roberts  [address]  [place] [address]     Dear Colleague,    Thank you for referring your patient, Shweta Roberts, to the [facility] DERMATOLOGY CLINIC [place] [facility]. Please see a copy of my visit note below.    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  - ISK, L vertex scalp, s/p shave bx 1/15/18  # Dysplastic nevi  - L lower lateral abdomen, compound dysplastic nevus with moderate atypia, s/p shave bx 1/15/18  # Psoriasis  - Elidel 1% cream  # Stucco keratosis, b/l lower extremities  - Amlactin daily  # Scalp pruritus and seb derm  - T/Sal shampoo daily   - TAC 0.1% spray daily PRN    # AKs - cryo  - returning Winter 2022 for cryo of the nasal bridge  ____________________________________________    Assessment & Plan:     # Actinic keratosis  Discussed that these are pre-cancerous lesions and 10% of them will go on to become non-melanoma skin cancers over their next 10 years of life, which is why we opt treat them.   - Cryotherapy performed today to posterior shoulders only, see procedure note below  - Encouraged daily sun protection with SPF 30+ and UPF clothing  - Patient deferred cryotherapy of nose until the colder months when she is not outside as much    # Seborrheic keratoses  - Discussed the natural history and benign nature of these lesions. Reassurance provided that no additional treatment is necessary, however cryotherapy can be performed if inflamed/irritated or bothersome to patient.     # Multiple clinically benign nevi  - Discussed benign etiology of these lesions and reassurance provided  - ABCDE's of melanoma reviewed with patient  - Encouraged daily sun protection with SPF 30+ and UPF  clothing    # History of non-melanoma skin cancer with no evidence of recurrence today  - Continue routine skin cancer screening exams  - Encouraged daily sun protection with SPF 30+ and UPF clothing  - Encouraged tanning bed avoidance      Procedures Performed:   - Cryotherapy procedure note, location(s): bilateral posterior shoulders. After verbal consent and discussion of risks and benefits including, but not limited to, dyspigmentation/scar, blister, and pain, 2 lesion(s) was(were) treated with 1-2 mm freeze border for 1-2 cycles with liquid nitrogen. Post cryotherapy instructions were provided.    Follow-up: 6 month(s) in-person for treatment of AKs on the nose, or earlier for new or changing lesions    Staff and Resident:     Janie Jara DO PGY-4  Department of Dermatology  [facility]    Staff: Dr. Dulce OSBORNE was present for the entire procedure. Renetta RENTERIA I, Renetta Cardona MD, saw this patient with the resident and agree with the resident s findings and plan of care as documented in the resident s note.    ____________________________________________    CC: No chief complaint on file.    HPI:  Ms. Shweta Roberts is a(n) 51 year old female who presents today as a return patient for a skin check due to history of NMSC. Reports a few rough scaly spots on the back of her shoulders and on the bridge of her nose. Reports wearing SPF 50 when outdoors.    Patient is otherwise feeling well, without additional skin concerns.    Labs Reviewed:  N/A    Physical Exam:  Vitals: There were no vitals taken for this visit.  SKIN: Full skin, which includes the head/face, both arms, chest, back, abdomen,both legs, genitalia and/or groin buttocks, digits and/or nails, was examined.  - There are erythematous macules with overyling adherent scale on the dorsal nose and posterior shoulders.   - There are waxy stuck on tan to brown papules on the trunk and extremities.   - There is no erythema,  telangectasias, nodularity, or pigmentation on the left posterior shoulder.  - Scattered brown macules on sun exposed areas of the face, trunk and extremities.  - No other lesions of concern on areas examined.     Medications:  Current Outpatient Medications   Medication     albuterol (PROAIR HFA, PROVENTIL HFA, VENTOLIN HFA) 108 (90 BASE) MCG/ACT inhaler     fexofenadine (ALLEGRA) 180 MG tablet     LORazepam (ATIVAN) 0.5 MG tablet     mupirocin (BACTROBAN) 2 % external ointment     norethindrone-ethinyl estradiol (MICROGESTIN) 1.5-30 MG-MCG per tablet     pimecrolimus (ELIDEL) 1 % external cream     vitamin C (ASCORBIC ACID) 100 MG tablet     vitamin D3 (CHOLECALCIFEROL) 50 mcg (2000 units) tablet     zinc gluconate 50 MG tablet     No current facility-administered medications for this visit.      Past Medical History:   Patient Active Problem List   Diagnosis     Anxiety     Asthma, mild intermittent     Allergic rhinitis     Knee pain     Hamstring muscle strain     Pain in joint involving ankle and foot     Psoriasis     Neoplasm of uncertain behavior of skin     S/P LEEP of cervix     Impingement syndrome of shoulder region, right     Past Medical History:   Diagnosis Date     Allergic rhinitis      Anxiety      Arthritis      Asthma, mild intermittent      CARDIOVASCULAR SCREENING; LDL GOAL LESS THAN 130        CC Ligia Rome NP  [address] [place] [address] on close of this encounter.

## 2022-12-21 ENCOUNTER — OFFICE VISIT (OUTPATIENT)
Dept: DERMATOLOGY | Facility: CLINIC | Age: 52
End: 2022-12-21
Payer: COMMERCIAL

## 2022-12-21 DIAGNOSIS — L57.0 ACTINIC KERATOSIS: Primary | ICD-10-CM

## 2022-12-21 PROCEDURE — 17000 DESTRUCT PREMALG LESION: CPT | Performed by: DERMATOLOGY

## 2022-12-21 PROCEDURE — 17003 DESTRUCT PREMALG LES 2-14: CPT | Performed by: DERMATOLOGY

## 2022-12-21 RX ORDER — TRIAMCINOLONE ACETONIDE 0.15 MG/G
AEROSOL, SPRAY TOPICAL
COMMUNITY
Start: 2013-07-09

## 2022-12-21 RX ORDER — BENZONATATE 100 MG/1
CAPSULE ORAL
COMMUNITY
Start: 2022-12-18

## 2022-12-21 ASSESSMENT — PAIN SCALES - GENERAL: PAINLEVEL: NO PAIN (0)

## 2022-12-21 NOTE — NURSING NOTE
Dermatology Rooming Note    Shweta Roberts's goals for this visit include:   Chief Complaint   Patient presents with     Derm Problem     Cryotherapy on face per patient     Cecilia Avina LPN

## 2022-12-21 NOTE — PATIENT INSTRUCTIONS
Cryotherapy    What is it?  Use of a very cold liquid, such as liquid nitrogen, to freeze and destroy abnormal skin cells that need to be removed    What should I expect?  Tenderness and redness  A small blister that might grow and fill with dark purple blood. There may be crusting.  More than one treatment may be needed if the lesions do not go away.    How do I care for the treated area?  Gently wash the area with your hands when bathing.  Use a thin layer of Vaseline to help with healing. You may use a Band-Aid.   The area should heal within 7-10 days and may leave behind a pink or lighter color.   Do not use an antibiotic or Neosporin ointment.   You may take acetaminophen (Tylenol) for pain.     Call your doctor if you have:  Severe pain  Signs of infection (warmth, redness, cloudy yellow drainage, and or a bad smell)  Questions or concerns    Who should I call with questions?      [facility]: [phone]      [facility]: [phone]      For urgent needs outside of business hours call the [facility] at [phone] and ask for the dermatology resident on call

## 2022-12-21 NOTE — PROGRESS NOTES
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      Procedures Performed:   - Cryotherapy procedure note, location(s): nose. After verbal consent and discussion of risks and benefits including, but not limited to, dyspigmentation/scar, blister, and pain, 6 lesion(s) was(were) treated with 1-2 mm freeze border for 1-2 cycles with liquid nitrogen. Post cryotherapy instructions were provided.      Follow-up: 6 month(s) in-person, or earlier for new or changing lesions    Staff:     Renetta Cardona MD  ____________________________________________    CC: Derm Problem (Cryotherapy on face per patient)    HPI:  Ms. Shweta Roberts is a(n) 52 year old female who presents today as a return patient for treatment of AK's of the nose in the winter months.  Started a new serum that has decreased some of the redness and scaling of the nose but areas do persist.  None are tender or bleeding    Patient is otherwise feeling well, without additional skin concerns.     Labs Reviewed:  N/A    Physical Exam:  Vitals: There were no vitals taken for this visit.  SKIN: Focused examination of face was performed.  - gritty red papules of the nose.   - No other lesions of concern on areas  examined.     Medications:  Current Outpatient Medications   Medication     albuterol (PROAIR HFA, PROVENTIL HFA, VENTOLIN HFA) 108 (90 BASE) MCG/ACT inhaler     benzonatate (TESSALON) 100 MG capsule     fexofenadine (ALLEGRA) 180 MG tablet     LORazepam (ATIVAN) 0.5 MG tablet     norethindrone-ethinyl estradiol (MICROGESTIN) 1.5-30 MG-MCG per tablet     pimecrolimus (ELIDEL) 1 % external cream     triamcinolone (KENALOG) 0.147 MG/GM external aerosol     mupirocin (BACTROBAN) 2 % external ointment     vitamin C (ASCORBIC ACID) 100 MG tablet     vitamin D3 (CHOLECALCIFEROL) 50 mcg (2000 units) tablet     zinc gluconate 50 MG tablet     No current facility-administered medications for this visit.      Past Medical History:   Patient Active Problem List   Diagnosis     Anxiety     Asthma, mild intermittent     Allergic rhinitis     Knee pain     Hamstring muscle strain     Pain in joint involving ankle and foot     Psoriasis     Neoplasm of uncertain behavior of skin     S/P LEEP of cervix     Impingement syndrome of shoulder region, right     Past Medical History:   Diagnosis Date     Allergic rhinitis      Anxiety      Arthritis      Asthma, mild intermittent      CARDIOVASCULAR SCREENING; LDL GOAL LESS THAN 130        CC Ligia Rome NP  XXX RETIRED XXX  [address] [place] [address] on close of this encounter.

## 2022-12-21 NOTE — LETTER
12/21/2022       RE: Shweta Roberts  [address]  [facility] [address]     Dear Colleague,    Thank you for referring your patient, Shweta Roberts, to the [facility] DERMATOLOGY [facility] [place] at [facility]. Please see a copy of my visit note below.    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      Procedures Performed:   - Cryotherapy procedure note, location(s): nose. After verbal consent and discussion of risks and benefits including, but not limited to, dyspigmentation/scar, blister, and pain, 6 lesion(s) was(were) treated with 1-2 mm freeze border for 1-2 cycles with liquid nitrogen. Post cryotherapy instructions were provided.      Follow-up: 6 month(s) in-person, or earlier for new or changing lesions    Staff:     Renetta Cardona MD  ____________________________________________    CC: Derm Problem (Cryotherapy on face per patient)    HPI:  Ms. Shweta Roberts is a(n) 52 year old female who presents today as a return patient for treatment of AK's of the nose in the winter months.  Started a new serum that has decreased some of the redness and scaling of the nose but areas do persist.   None are tender or bleeding    Patient is otherwise feeling well, without additional skin concerns.     Labs Reviewed:  N/A    Physical Exam:  Vitals: There were no vitals taken for this visit.  SKIN: Focused examination of face was performed.  - gritty red papules of the nose.   - No other lesions of concern on areas examined.     Medications:  Current Outpatient Medications   Medication     albuterol (PROAIR HFA, PROVENTIL HFA, VENTOLIN HFA) 108 (90 BASE) MCG/ACT inhaler     benzonatate (TESSALON) 100 MG capsule     fexofenadine (ALLEGRA) 180 MG tablet     LORazepam (ATIVAN) 0.5 MG tablet     norethindrone-ethinyl estradiol (MICROGESTIN) 1.5-30 MG-MCG per tablet     pimecrolimus (ELIDEL) 1 % external cream     triamcinolone (KENALOG) 0.147 MG/GM external aerosol     mupirocin (BACTROBAN) 2 % external ointment     vitamin C (ASCORBIC ACID) 100 MG tablet     vitamin D3 (CHOLECALCIFEROL) 50 mcg (2000 units) tablet     zinc gluconate 50 MG tablet     No current facility-administered medications for this visit.      Past Medical History:   Patient Active Problem List   Diagnosis     Anxiety     Asthma, mild intermittent     Allergic rhinitis     Knee pain     Hamstring muscle strain     Pain in joint involving ankle and foot     Psoriasis     Neoplasm of uncertain behavior of skin     S/P LEEP of cervix     Impingement syndrome of shoulder region, right     Past Medical History:   Diagnosis Date     Allergic rhinitis      Anxiety      Arthritis      Asthma, mild intermittent      CARDIOVASCULAR SCREENING; LDL GOAL LESS THAN 130        CC Ligia Rome NP  XXX RETIRED XXX  [address] [place] [address] on close of this encounter.

## 2023-02-22 ENCOUNTER — TELEPHONE (OUTPATIENT)
Dept: DERMATOLOGY | Facility: CLINIC | Age: 53
End: 2023-02-22

## 2023-02-22 NOTE — TELEPHONE ENCOUNTER
Writer called pt back to schedule her in for an earlier slot than Sept. Pt stated that September would be too far out for skin check. Writer scheduled pt for 5/1 with Dr. Cardona.

## 2023-02-22 NOTE — TELEPHONE ENCOUNTER
" [facility] Call Center    Phone Message    May a detailed message be left on voicemail: yes     Reason for Call: Other: pt called and needed to r/s her apt due to Dr. Cardona being out - writer scheduled pt for 9/19 but pt stated \"i cant wait another 6 mths for this 6 mth f/u and need to be seen sooner\" please call pt to further discuss, thanks!     Action Taken: Message routed to:  [facility] Derm    Travel Screening: Not Applicable                                                                      "

## 2023-03-26 ENCOUNTER — HEALTH MAINTENANCE LETTER (OUTPATIENT)
Age: 53
End: 2023-03-26

## 2023-04-26 ENCOUNTER — TELEPHONE (OUTPATIENT)
Dept: DERMATOLOGY | Facility: CLINIC | Age: 53
End: 2023-04-26
Payer: COMMERCIAL

## 2023-05-01 ENCOUNTER — OFFICE VISIT (OUTPATIENT)
Dept: DERMATOLOGY | Facility: CLINIC | Age: 53
End: 2023-05-01
Payer: COMMERCIAL

## 2023-05-01 DIAGNOSIS — D22.9 MULTIPLE NEVI: ICD-10-CM

## 2023-05-01 DIAGNOSIS — L82.1 SK (SEBORRHEIC KERATOSIS): ICD-10-CM

## 2023-05-01 DIAGNOSIS — Z85.828 HISTORY OF SKIN CANCER: ICD-10-CM

## 2023-05-01 DIAGNOSIS — D18.01 CHERRY ANGIOMA: ICD-10-CM

## 2023-05-01 DIAGNOSIS — L40.9 PSORIASIS: Primary | ICD-10-CM

## 2023-05-01 DIAGNOSIS — L81.4 SOLAR LENTIGO: ICD-10-CM

## 2023-05-01 DIAGNOSIS — L85.3 XEROSIS CUTIS: ICD-10-CM

## 2023-05-01 PROCEDURE — 99214 OFFICE O/P EST MOD 30 MIN: CPT | Performed by: DERMATOLOGY

## 2023-05-01 ASSESSMENT — PAIN SCALES - GENERAL: PAINLEVEL: NO PAIN (0)

## 2023-05-01 NOTE — NURSING NOTE
Dermatology Rooming Note    Shweta Roberts's goals for this visit include:   Chief Complaint   Patient presents with     Skin Check     Patient reports spot of concern on right shoulder and arm. Patient would like a full body skin check.      Caitlin Pruitt LPN

## 2023-05-01 NOTE — LETTER
5/1/2023       RE: Shweta Roberts  [address]  [facility] [address]     Dear Colleague,    Thank you for referring your patient, Shweta Roberts, to the [facility] DERMATOLOGY CLINIC [place] at [facility]. Please see a copy of my visit note below.    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     # AK x 2 of nose ( right nasal tip, left nasal bridge)   - Patient has upcoming social event  - Will treat in Fall    # benign findings: cherry angiomas, lentigo, seborrheic keratoses    # Nevi- some with slight clinical irregularity but no atypia on dermoscopy     # History of skin cancer- NERD      Follow-up: 6 month(s) in-person, or earlier for new or changing lesions    Staff:     Renetta Cardona MD  ____________________________________________    CC: Skin Check (Patient reports spot of concern on right shoulder and arm. Patient would like a full body skin check. )    HPI:  Ms. Shweta Roberts is a(n) 52 year old female who presents today as a return patient for a skin check.  No tender or bleeding lesions.  Does note some scaly spots of face, neck and trunk that does respond to her Elidel cream- she thinks they are psoriasis.   Has scaly scalp.  Notes some remaining gritty areas of nose.  Does have a social event upcoming.     Patient is otherwise feeling well, without additional skin concerns.     Labs Reviewed:  N/A    Physical Exam:  Vitals: There were no vitals taken for this visit.  SKIN: Total skin excluding the undergarment areas but including buttocks was performed. The exam included the head/face, neck, both arms, chest, back, abdomen, both legs, digits and/or nails.   - red gritty papules of left nasal bridge and right nasal tip  - annular red scaly plaques of right cheek, right neck, trunk and arms  - scar with NERD of back  - multiple slightly irregular nevi (mainly abdomen) but no signficant atypia on dermoscopy  - lentigo  - cherry angiomas  - seborrheic keratoses   - No other lesions of concern on areas examined.     Medications:  Current Outpatient Medications   Medication    albuterol (PROAIR HFA, PROVENTIL HFA, VENTOLIN HFA) 108 (90 BASE) MCG/ACT inhaler    benzonatate (TESSALON) 100 MG capsule    fexofenadine (ALLEGRA) 180 MG tablet    LORazepam (ATIVAN) 0.5 MG tablet    norethindrone-ethinyl estradiol (MICROGESTIN) 1.5-30 MG-MCG per tablet    pimecrolimus (ELIDEL) 1 % external cream    triamcinolone (KENALOG) 0.147 MG/GM external aerosol    mupirocin (BACTROBAN) 2 % external ointment     No current facility-administered medications for this visit.      Past Medical History:   Patient Active Problem List   Diagnosis    Anxiety    Asthma, mild intermittent    Allergic rhinitis    Knee pain    Hamstring muscle strain    Pain in joint involving ankle and foot    Psoriasis    Neoplasm of uncertain behavior of skin    S/P LEEP of cervix    Impingement syndrome of shoulder region, right     Past Medical History:   Diagnosis Date    Allergic rhinitis     Anxiety     Arthritis     Asthma, mild intermittent     CARDIOVASCULAR SCREENING; LDL GOAL LESS THAN 130        CC No referring provider defined for this encounter. on close  of this encounter.

## 2023-05-01 NOTE — PROGRESS NOTES
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     # AK x 2 of nose ( right nasal tip, left nasal bridge)   - Patient has upcoming social event  - Will treat in Fall    # benign findings: cherry angiomas, lentigo, seborrheic keratoses    # Nevi- some with slight clinical irregularity but no atypia on dermoscopy     # History of skin cancer- NERD      Follow-up: 6 month(s) in-person, or earlier for new or changing lesions    Staff:     Renetta Cardona MD  ____________________________________________    CC: Skin Check (Patient reports spot of concern on right shoulder and arm. Patient would like a full body skin check. )    HPI:  Ms. Shweta Roberts is a(n) 52 year old female who presents today as a return patient for a skin check.  No tender or bleeding lesions.  Does note some scaly spots of face, neck and trunk that does respond to her Elidel cream- she thinks they are psoriasis.  Has scaly scalp.  Notes some remaining gritty areas of nose.  Does have a social event upcoming.     Patient is otherwise feeling well, without additional skin concerns.     Labs Reviewed:  N/A    Physical Exam:  Vitals: There were no vitals taken for this visit.  SKIN: Total skin excluding the undergarment areas  but including buttocks was performed. The exam included the head/face, neck, both arms, chest, back, abdomen, both legs, digits and/or nails.   - red gritty papules of left nasal bridge and right nasal tip  - annular red scaly plaques of right cheek, right neck, trunk and arms  - scar with NERD of back  - multiple slightly irregular nevi (mainly abdomen) but no signficant atypia on dermoscopy  - lentigo  - cherry angiomas  - seborrheic keratoses   - No other lesions of concern on areas examined.     Medications:  Current Outpatient Medications   Medication     albuterol (PROAIR HFA, PROVENTIL HFA, VENTOLIN HFA) 108 (90 BASE) MCG/ACT inhaler     benzonatate (TESSALON) 100 MG capsule     fexofenadine (ALLEGRA) 180 MG tablet     LORazepam (ATIVAN) 0.5 MG tablet     norethindrone-ethinyl estradiol (MICROGESTIN) 1.5-30 MG-MCG per tablet     pimecrolimus (ELIDEL) 1 % external cream     triamcinolone (KENALOG) 0.147 MG/GM external aerosol     mupirocin (BACTROBAN) 2 % external ointment     No current facility-administered medications for this visit.      Past Medical History:   Patient Active Problem List   Diagnosis     Anxiety     Asthma, mild intermittent     Allergic rhinitis     Knee pain     Hamstring muscle strain     Pain in joint involving ankle and foot     Psoriasis     Neoplasm of uncertain behavior of skin     S/P LEEP of cervix     Impingement syndrome of shoulder region, right     Past Medical History:   Diagnosis Date     Allergic rhinitis      Anxiety      Arthritis      Asthma, mild intermittent      CARDIOVASCULAR SCREENING; LDL GOAL LESS THAN 130        CC No referring provider defined for this encounter. on close of this encounter.

## 2023-11-14 ENCOUNTER — OFFICE VISIT (OUTPATIENT)
Dept: DERMATOLOGY | Facility: CLINIC | Age: 53
End: 2023-11-14
Payer: COMMERCIAL

## 2023-11-14 DIAGNOSIS — D48.9 NEOPLASM OF UNCERTAIN BEHAVIOR: ICD-10-CM

## 2023-11-14 DIAGNOSIS — L57.0 ACTINIC KERATOSIS: ICD-10-CM

## 2023-11-14 DIAGNOSIS — L70.0 ACNE VULGARIS: Primary | ICD-10-CM

## 2023-11-14 PROCEDURE — 88305 TISSUE EXAM BY PATHOLOGIST: CPT | Mod: 26 | Performed by: DERMATOLOGY

## 2023-11-14 PROCEDURE — 17003 DESTRUCT PREMALG LES 2-14: CPT | Mod: XS | Performed by: DERMATOLOGY

## 2023-11-14 PROCEDURE — 99213 OFFICE O/P EST LOW 20 MIN: CPT | Mod: 25 | Performed by: DERMATOLOGY

## 2023-11-14 PROCEDURE — 88305 TISSUE EXAM BY PATHOLOGIST: CPT | Mod: TC | Performed by: DERMATOLOGY

## 2023-11-14 PROCEDURE — 11102 TANGNTL BX SKIN SINGLE LES: CPT | Mod: GC | Performed by: DERMATOLOGY

## 2023-11-14 PROCEDURE — 17000 DESTRUCT PREMALG LESION: CPT | Mod: XS | Performed by: DERMATOLOGY

## 2023-11-14 RX ORDER — TRETINOIN 0.25 MG/G
CREAM TOPICAL
Qty: 45 G | Refills: 1 | Status: SHIPPED | OUTPATIENT
Start: 2023-11-14

## 2023-11-14 ASSESSMENT — PAIN SCALES - GENERAL: PAINLEVEL: MODERATE PAIN (4)

## 2023-11-14 NOTE — PATIENT INSTRUCTIONS
Follow up in six months     Shave Biopsy Instructions    For the biopsied area, leave the bandaid on for 24 hours. After 24 hours, wash the biopsied area with soap and water and then put on a Aquaphor, Vasaline, Vaniply, or plain petroleum jelly and cover a bandaid. Wash and replace the ointment and a bandaid every day until the area heals. If you develop spreading redness, pus, or tenderness, please call the clinic. You may experience some mild itching as the skin heals. Please try to avoid scratching the area.     If the biopsy area were to start to bleed, apply firm direct pressure for 15 minutes without peeking. If after 15 minutes, the area is still bleeding, you can repeat the 15 minutes of pressure for 2 more times. But, if after 45 minutes, it is still bleeding, please call the clinic or go to urgent care/the emergency department.     It may take up to 2 weeks to get a result from the biopsy taken today. If you have not received a message or notification of the result after 2 weeks, please call our office.       Cryotherapy Instructions    For the areas treated with liquid nitrogen (cryotherapy or freezing) today, they are expected to get pink, puffy, and perhaps even blister. The area should then crust up and fall off and the goal is to have nice new skin underneath. There is nothing special that you need to do for these areas. You can wash them as you do normal skin.     Sometimes a blister develops; if a blister does develop do NOT pop it. However, if it breaks open on its own, be sure to wash it with soap and water daily and put plain vasaline or petroleum jelly and a bandaid on it until the skin is healed.     Please call the clinic if you have any questions or concerns.

## 2023-11-14 NOTE — NURSING NOTE
Dermatology Rooming Note    Shweta Roberts's goals for this visit include:   Chief Complaint   Patient presents with    Derm Problem     FBSE- wants to have cryo on nose, tender spot on scalp, unknown if caused by trauma near crown on R side.      Shweta Jeronimo, EMT

## 2023-11-14 NOTE — PROGRESS NOTES
Lidocaine-epinephrine 1-1:563812 % injection   1mL once for one use, starting 11/14/2023 ending 11/14/2023,  2mL disp, R-0, injection  Injected by Dr. Charlee Fitzgerald MD

## 2023-11-14 NOTE — LETTER
11/14/2023       RE: Shweta Roberts  [address]  [facility] [address]     Dear Colleague,    Thank you for referring your patient, Shweta Roberts, to the [facility] DERMATOLOGY CLINIC [place] at [facility]. Please see a copy of my visit note below.    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  - ISK, L vertex scalp, s/p shave bx 1/15/18  # Dysplastic nevi  - L lower lateral abdomen, compound dysplastic nevus with moderate atypia, s/p shave bx 1/15/18  # Psoriasis  - Elidel 1% cream  # Stucco keratosis, b/l lower extremities  - Amlactin daily  # Scalp pruritus and seb derm  - T/Sal shampoo daily   - TAC 0.1% spray daily PRN    # AKs - cryo  # Prurigo Nodule  - L inner breast      ____________________________________________    Assessment & Plan:   # Neoplasm of uncertain behavior on the L Upper back r/o NMSC  -s/p shave bx 11/14/23     # AK x 4 of nose and glabella    - LN2 preformed today, procedure note below    #Multiple Benign Nevi, Hays Angiomas, Sks  - Counseled the patient about the benign nature of these lesions     # History of skin cancer- No evidence of recurrent disease    # Acne Vulgaris   - Recommend starting Tretinoin 0.025% cream nightly  -Recommend starting with a pea sized amount every other night and increasing to nightly as tolerated.     #Excoriated papule on L inner breast favor Prurigo Nodularis   - Counseled patient about the benign nature of the lesion.     Procedure:   - PROCEDURE - Cryotherapy: (4) Nasal ridge, left nasal ala, glabella Lesions were frozen with liquid nitrogen with a 10 sec thaw time for a total of 2 cycles.  Usual warnings including bleeding, infection, recurrence, scarring, and pigmentation change were reviewed. After care instructions were reviewed and written hand out was provided.   - PROCEDURE: Shave biopsy, L Upper Back   After discussion of benefits and risks including but not limited to bleeding, infection, scar, incomplete removal, recurrence, and non-diagnostic biopsy, verbal consent and photographs were obtained. The area was cleaned with isopropyl alcohol. < 1mL of 1% lidocaine with epinephrine was injected to obtain adequate anesthesia. A shave biopsy was performed. Hemostasis was achieved with aluminium chloride. Vaseline and a sterile dressing were applied. The patient tolerated the procedure and no complications were noted. The patient was provided with verbal and written post care instructions.        Follow-up: 6 month(s) in-person, or earlier for new or changing lesions    Staff and Resident:     Charlee Fitzgerald MD  Dermatology Resident, PGY-3  I was present for key portions of the biopsy and the entire cryotherapy procedure. Renetta Cardona MD   I, Renetta Cardona MD, saw this patient with the resident and agree with the resident s findings and plan of care as documented in the resident s note.      ____________________________________________    CC: Derm Problem (FBSE- wants to have cryo on nose, tender spot on scalp, unknown if caused by trauma near crown on R side. )    HPI:  Ms. Shweta Roberts is a(n) 53 year old female who presents today as a return patient for a skin check.  No tender or bleeding lesions.      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       Patient is otherwise feeling well, without additional skin concerns.     Labs Reviewed:  N/A    Physical Exam:  Vitals: There were no vitals taken for this visit.  SKIN: Total skin excluding the  undergarment areas but including buttocks was performed. The exam included the head/face, neck, both arms, chest, back, abdomen, both legs, digits and/or nails.   - red gritty papules of left nasal bridge and right nasal tip  - Well healing scar, No evidence of recurrent disease on the back  - multiple slightly irregular nevi (mainly abdomen) but no signficant atypia on dermoscopy  - Excoriated nodule with no atypical vasculature noted on the left inner breast   - L Upper back with eroded papule with atypical loop vessels noted on dermoscopy   - No other lesions of concern on areas examined.     Medications:  Current Outpatient Medications   Medication    albuterol (PROAIR HFA, PROVENTIL HFA, VENTOLIN HFA) 108 (90 BASE) MCG/ACT inhaler    benzonatate (TESSALON) 100 MG capsule    fexofenadine (ALLEGRA) 180 MG tablet    LORazepam (ATIVAN) 0.5 MG tablet    norethindrone-ethinyl estradiol (MICROGESTIN) 1.5-30 MG-MCG per tablet    pimecrolimus (ELIDEL) 1 % external cream    triamcinolone (KENALOG) 0.147 MG/GM external aerosol    mupirocin (BACTROBAN) 2 % external ointment     No current facility-administered medications for this visit.      Past Medical History:   Patient Active Problem List   Diagnosis    Anxiety    Asthma, mild intermittent    Allergic rhinitis    Knee pain    Hamstring muscle strain    Pain in joint involving ankle and foot    Psoriasis    Neoplasm of uncertain behavior of skin    S/P LEEP of cervix    Impingement syndrome of shoulder region, right     Past Medical History:   Diagnosis Date    Allergic rhinitis     Anxiety     Arthritis     Asthma, mild intermittent     CARDIOVASCULAR SCREENING; LDL GOAL LESS THAN 130        CC No referring provider defined for this encounter. on close of this encounter.    Lidocaine-epinephrine 1-1:545931 % injection   1mL once for one use, starting 11/14/2023 ending 11/14/2023,  2mL disp, R-0, injection  Injected by Dr. Charlee Fitzgerald MD

## 2023-11-15 NOTE — RESULT ENCOUNTER NOTE
Good news. The skin biopsy was just an ulcerated pre skin cancer.  No actual skin cancer. Often the biopsy will take care of this spot.  Let me know if it seems to be coming back after the site heals.

## 2024-05-13 ENCOUNTER — OFFICE VISIT (OUTPATIENT)
Dept: DERMATOLOGY | Facility: CLINIC | Age: 54
End: 2024-05-13
Attending: DERMATOLOGY
Payer: COMMERCIAL

## 2024-05-13 DIAGNOSIS — D49.2 NEOPLASM OF UNSPECIFIED BEHAVIOR OF BONE, SOFT TISSUE, AND SKIN: Primary | ICD-10-CM

## 2024-05-13 DIAGNOSIS — Z85.828 HISTORY OF NONMELANOMA SKIN CANCER: ICD-10-CM

## 2024-05-13 DIAGNOSIS — L82.0 INFLAMED SEBORRHEIC KERATOSIS: ICD-10-CM

## 2024-05-13 DIAGNOSIS — L57.0 ACTINIC KERATOSIS: ICD-10-CM

## 2024-05-13 PROCEDURE — 17110 DESTRUCTION B9 LES UP TO 14: CPT | Mod: GC | Performed by: DERMATOLOGY

## 2024-05-13 PROCEDURE — 11102 TANGNTL BX SKIN SINGLE LES: CPT | Mod: XS | Performed by: DERMATOLOGY

## 2024-05-13 PROCEDURE — 17003 DESTRUCT PREMALG LES 2-14: CPT | Mod: XS | Performed by: DERMATOLOGY

## 2024-05-13 PROCEDURE — 88305 TISSUE EXAM BY PATHOLOGIST: CPT | Mod: 26 | Performed by: PATHOLOGY

## 2024-05-13 PROCEDURE — 17000 DESTRUCT PREMALG LESION: CPT | Mod: XS | Performed by: DERMATOLOGY

## 2024-05-13 PROCEDURE — 88305 TISSUE EXAM BY PATHOLOGIST: CPT | Mod: TC | Performed by: DERMATOLOGY

## 2024-05-13 PROCEDURE — 99213 OFFICE O/P EST LOW 20 MIN: CPT | Mod: 25 | Performed by: DERMATOLOGY

## 2024-05-13 RX ORDER — ACETAMINOPHEN AND CODEINE PHOSPHATE 120; 12 MG/5ML; MG/5ML
0.35 SOLUTION ORAL
COMMUNITY
Start: 2024-03-28

## 2024-05-13 ASSESSMENT — PAIN SCALES - GENERAL: PAINLEVEL: NO PAIN (0)

## 2024-05-13 NOTE — LETTER
5/13/2024       RE: Shweta Roberts  [address]  [facility] [address]     Dear Colleague,    Thank you for referring your patient, Shweta Roberts, to the [facility] DERMATOLOGY CLINIC [place] at [facility]. Please see a copy of my visit note below.    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 Will proceed with shave biopsy as below for diagnostic clarification.     # Actinic keratoses  There are a couple scattered AKs on the nasal bridge & R cheek  - Cryotherapy as below    # Benign lesions (cherry angiomas, seborrheic keratoses, lentigines, clinically banal appearing melanocytic nevi)  - Reassurance provided and benign nature of condition discussed  - 2 inflamed SKs on L central shin & R triceps treated with cryotherapy as below    # Hx of NMSC  - NERD  - Signs and symptoms of NMSC discussed  - Sun precaution was advised including the use of sun screens of SPF 30 or higher, sun protective clothing, and avoidance of tanning beds      Procedures Performed:   - Shave biopsy procedure note, location(s): L antecubital  fossa. After discussion of benefits and risks including but not limited to bleeding, infection, scar, incomplete removal, recurrence, and non-diagnostic biopsy, verbal consent and photographs were obtained. The area was cleaned with isopropyl alcohol. 0.5mL of 1% lidocaine with epinephrine was injected to obtain adequate anesthesia of lesion(s). Shave biopsy at site(s) performed. Hemostasis was achieved with aluminium chloride. Petrolatum ointment and a sterile dressing were applied. The patient tolerated the procedure and no complications were noted. The patient was provided with verbal and written post care instructions.   - Cryotherapy procedure note, location(s): nasal bridge, R cheek, L central shin, R triceps. After verbal consent and discussion of risks and benefits including, but not limited to, dyspigmentation/scar, blister, and pain, 4 lesion(s) was(were) treated with 1-2 mm freeze border for 1-2 cycles with liquid nitrogen. Post cryotherapy instructions were provided.    Follow-up: 6 month(s) in-person, or earlier for new or changing lesions    Staff and Resident:     Attending: Dr. Cardona staffed the patient.    Resident: Shahzad Maier MD (PGY-4)  I was present for key portions of the biopsy and the entire cryotherapy procedure. Renetta Cardona MD   I, Renetta Cardona MD, saw this patient with the resident and agree with the resident s findings and plan of care as documented in the resident s note.    ____________________________________________    CC: Skin Check (Shweta is here today for a skin check )    HPI:  Ms. Shweta Roberts is a(n) 53 year old female who presents today as a return patient for skin check. Reports several areas of concern including pink spots on her L antecubital fossa, L central shin, and R triceps. Also noticed a gritty rough spot on her nasal bridge. Lesions are all asymptomatic without pain, itch, or recent growth/changes. Patient is otherwise feeling well, without additional  skin concerns.    Labs Reviewed:  N/A    Physical Exam:  Vitals: There were no vitals taken for this visit.  SKIN: Full skin, which includes the head/face, both arms, chest, back, abdomen,both legs, genitalia and/or groin buttocks, digits and/or nails, was examined.  - Nasal bridge and right cheek with pink gritty papules  - L antecubital fossa with shiny pink 8 mm x 4 mm papule with arborizing vessels  - R triceps with pink plaque with crusted erosion  - L central shin with pink-tan waxy papule  - There are dome shaped bright red papules on the trunk and extremities.   - Multiple regular brown pigmented macules and papules are identified on the trunk and extremities.   - Scattered brown macules on sun exposed areas.  - There are waxy stuck on tan to brown papules on the trunk and extremities.  - Sites of prior skin cancer examined. There is no erythema, telangectasias, nodularity, or pigmentation at the sites. No evidence of recurrence by inspection or palpation.        Medications:  Current Outpatient Medications   Medication Sig Dispense Refill    albuterol (PROAIR HFA, PROVENTIL HFA, VENTOLIN HFA) 108 (90 BASE) MCG/ACT inhaler Inhale 2 puffs into the lungs every 6 hours as needed for shortness of breath / dyspnea 1 Inhaler 0    benzonatate (TESSALON) 100 MG capsule TAKE 1 TO 2 CAPSULES BY MOUTH THREE TIMES DAILY AS NEEDED      LORazepam (ATIVAN) 0.5 MG tablet Take 0.5-1 tablets (0.25-0.5 mg) by mouth every 8 hours as needed for anxiety Do not operate a vehicle after taking this medication 15 tablet 0    norethindrone (MICRONOR) 0.35 MG tablet Take 0.35 mg by mouth      pimecrolimus (ELIDEL) 1 % external cream Apply twice daily as needed to areas of psoriasis 60 g 3    tretinoin (RETIN-A) 0.025 % external cream Use every night 45 g 1    triamcinolone (KENALOG) 0.147 MG/GM external aerosol Use prn      fexofenadine (ALLEGRA) 180 MG tablet Take 1 tablet (180 mg) by mouth daily 90 tablet 3    mupirocin (BACTROBAN) 2  % external ointment Use 2 times a day to affected area. (Patient not taking: Reported on 12/21/2022) 30 g 3    norethindrone-ethinyl estradiol (MICROGESTIN) 1.5-30 MG-MCG per tablet Take 1 tablet by mouth daily To be taken continuously (Patient not taking: Reported on 5/13/2024) 112 tablet 0     No current facility-administered medications for this visit.      Past Medical History:   Patient Active Problem List   Diagnosis    Anxiety    Asthma, mild intermittent    Allergic rhinitis    Knee pain    Hamstring muscle strain    Pain in joint involving ankle and foot    Psoriasis    Neoplasm of uncertain behavior of skin    S/P LEEP of cervix    Impingement syndrome of shoulder region, right     Past Medical History:   Diagnosis Date    Allergic rhinitis     Anxiety     Arthritis     Asthma, mild intermittent     CARDIOVASCULAR SCREENING; LDL GOAL LESS THAN 130        CC Renetta Cardona MD  [address] [facility] [address]  [place] [address] on close of this encounter.      Lidocaine-epinephrine 1-1:201531 % injection   0.5mL once for one use, starting 5/13/2024 ending 5/13/2024,  2mL disp, R-0, injection  Injected by Shweta Jeronimo, EMT

## 2024-05-13 NOTE — PROGRESS NOTES
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 Will proceed with shave biopsy as below for diagnostic clarification.     # Actinic keratoses  There are a couple scattered AKs on the nasal bridge & R cheek  - Cryotherapy as below    # Benign lesions (cherry angiomas, seborrheic keratoses, lentigines, clinically banal appearing melanocytic nevi)  - Reassurance provided and benign nature of condition discussed  - 2 inflamed SKs on L central shin & R triceps treated with cryotherapy as below    # Hx of NMSC  - NERD  - Signs and symptoms of NMSC discussed  - Sun precaution was advised including the use of sun screens of SPF 30 or higher, sun protective clothing, and avoidance of tanning beds      Procedures Performed:   - Shave biopsy procedure note, location(s): L antecubital fossa. After discussion of benefits and risks including but not limited to bleeding, infection, scar, incomplete removal, recurrence, and non-diagnostic biopsy, verbal consent and photographs were obtained. The area was cleaned with isopropyl alcohol. 0.5mL of 1% lidocaine with epinephrine was injected to obtain  adequate anesthesia of lesion(s). Shave biopsy at site(s) performed. Hemostasis was achieved with aluminium chloride. Petrolatum ointment and a sterile dressing were applied. The patient tolerated the procedure and no complications were noted. The patient was provided with verbal and written post care instructions.   - Cryotherapy procedure note, location(s): nasal bridge, R cheek, L central shin, R triceps. After verbal consent and discussion of risks and benefits including, but not limited to, dyspigmentation/scar, blister, and pain, 4 lesion(s) was(were) treated with 1-2 mm freeze border for 1-2 cycles with liquid nitrogen. Post cryotherapy instructions were provided.    Follow-up: 6 month(s) in-person, or earlier for new or changing lesions    Staff and Resident:     Attending: Dr. Cardona staffed the patient.    Resident: Shahzad Maier MD (PGY-4)  I was present for key portions of the biopsy and the entire cryotherapy procedure. Renetta Cardona MD   I, Renetta Cardona MD, saw this patient with the resident and agree with the resident s findings and plan of care as documented in the resident s note.    ____________________________________________    CC: Skin Check (Shweta is here today for a skin check )    HPI:  Ms. Shweta Roberts is a(n) 53 year old female who presents today as a return patient for skin check. Reports several areas of concern including pink spots on her L antecubital fossa, L central shin, and R triceps. Also noticed a gritty rough spot on her nasal bridge. Lesions are all asymptomatic without pain, itch, or recent growth/changes. Patient is otherwise feeling well, without additional skin concerns.    Labs Reviewed:  N/A    Physical Exam:  Vitals: There were no vitals taken for this visit.  SKIN: Full skin, which includes the head/face, both arms, chest, back, abdomen,both legs, genitalia and/or groin buttocks, digits and/or nails, was examined.  - Nasal bridge and right cheek with pink  gritty papules  - L antecubital fossa with shiny pink 8 mm x 4 mm papule with arborizing vessels  - R triceps with pink plaque with crusted erosion  - L central shin with pink-tan waxy papule  - There are dome shaped bright red papules on the trunk and extremities.   - Multiple regular brown pigmented macules and papules are identified on the trunk and extremities.   - Scattered brown macules on sun exposed areas.  - There are waxy stuck on tan to brown papules on the trunk and extremities.  - Sites of prior skin cancer examined. There is no erythema, telangectasias, nodularity, or pigmentation at the sites. No evidence of recurrence by inspection or palpation.        Medications:  Current Outpatient Medications   Medication Sig Dispense Refill    albuterol (PROAIR HFA, PROVENTIL HFA, VENTOLIN HFA) 108 (90 BASE) MCG/ACT inhaler Inhale 2 puffs into the lungs every 6 hours as needed for shortness of breath / dyspnea 1 Inhaler 0    benzonatate (TESSALON) 100 MG capsule TAKE 1 TO 2 CAPSULES BY MOUTH THREE TIMES DAILY AS NEEDED      LORazepam (ATIVAN) 0.5 MG tablet Take 0.5-1 tablets (0.25-0.5 mg) by mouth every 8 hours as needed for anxiety Do not operate a vehicle after taking this medication 15 tablet 0    norethindrone (MICRONOR) 0.35 MG tablet Take 0.35 mg by mouth      pimecrolimus (ELIDEL) 1 % external cream Apply twice daily as needed to areas of psoriasis 60 g 3    tretinoin (RETIN-A) 0.025 % external cream Use every night 45 g 1    triamcinolone (KENALOG) 0.147 MG/GM external aerosol Use prn      fexofenadine (ALLEGRA) 180 MG tablet Take 1 tablet (180 mg) by mouth daily 90 tablet 3    mupirocin (BACTROBAN) 2 % external ointment Use 2 times a day to affected area. (Patient not taking: Reported on 12/21/2022) 30 g 3    norethindrone-ethinyl estradiol (MICROGESTIN) 1.5-30 MG-MCG per tablet Take 1 tablet by mouth daily To be taken continuously (Patient not taking: Reported on 5/13/2024) 112 tablet 0     No current  facility-administered medications for this visit.      Past Medical History:   Patient Active Problem List   Diagnosis    Anxiety    Asthma, mild intermittent    Allergic rhinitis    Knee pain    Hamstring muscle strain    Pain in joint involving ankle and foot    Psoriasis    Neoplasm of uncertain behavior of skin    S/P LEEP of cervix    Impingement syndrome of shoulder region, right     Past Medical History:   Diagnosis Date    Allergic rhinitis     Anxiety     Arthritis     Asthma, mild intermittent     CARDIOVASCULAR SCREENING; LDL GOAL LESS THAN 130        CC Renetta Cardona MD  [address] [facility] [address]  [place] [address] on close of this encounter.

## 2024-05-13 NOTE — NURSING NOTE
Dermatology Rooming Note    Shweta Roberts's goals for this visit include:   Chief Complaint   Patient presents with    Skin Check     Shweta is here today for a skin check      LISANDRA Perez - Dermatology

## 2024-05-13 NOTE — PROGRESS NOTES
Lidocaine-epinephrine 1-1:185294 % injection   0.5mL once for one use, starting 5/13/2024 ending 5/13/2024,  2mL disp, R-0, injection  Injected by Shweta Jeronimo EMT

## 2024-05-15 DIAGNOSIS — C44.619 BASAL CELL CARCINOMA (BCC) OF LEFT ELBOW: Primary | ICD-10-CM

## 2024-05-15 NOTE — RESULT ENCOUNTER NOTE
Unfortunately, the skin biopsy does show a superficial skin cancer, a basal cell carcinoma.  We need to fully remove the area and I will place a referral to our derm surgery team.  They will contact you for an appointment.

## 2024-05-21 ENCOUNTER — TELEPHONE (OUTPATIENT)
Dept: DERMATOLOGY | Facility: CLINIC | Age: 54
End: 2024-05-21
Payer: COMMERCIAL

## 2024-05-21 NOTE — TELEPHONE ENCOUNTER
Called patient to schedule surgery with Dr. Benton    Date of Surgery: 07/23    Surgery type: Mohs    Consult scheduled: No    Has patient had mohs with us before? Yes    Additional comments: pt declined optional consult. Pt will be in [place] July 5-16      Tamar Love on 5/21/2024 at 1:23 PM

## 2024-07-23 ENCOUNTER — TELEPHONE (OUTPATIENT)
Dept: DERMATOLOGY | Facility: CLINIC | Age: 54
End: 2024-07-23

## 2024-07-23 ENCOUNTER — OFFICE VISIT (OUTPATIENT)
Dept: DERMATOLOGY | Facility: CLINIC | Age: 54
End: 2024-07-23
Payer: COMMERCIAL

## 2024-07-23 VITALS — SYSTOLIC BLOOD PRESSURE: 125 MMHG | HEART RATE: 78 BPM | DIASTOLIC BLOOD PRESSURE: 55 MMHG

## 2024-07-23 DIAGNOSIS — C44.619 BASAL CELL CARCINOMA (BCC) OF LEFT ELBOW: ICD-10-CM

## 2024-07-23 PROCEDURE — 12032 INTMD RPR S/A/T/EXT 2.6-7.5: CPT | Mod: GC | Performed by: DERMATOLOGY

## 2024-07-23 PROCEDURE — 17313 MOHS 1 STAGE T/A/L: CPT | Mod: GC | Performed by: DERMATOLOGY

## 2024-07-23 ASSESSMENT — PAIN SCALES - GENERAL: PAINLEVEL: NO PAIN (0)

## 2024-07-23 NOTE — PATIENT INSTRUCTIONS
Suture removal in 2 weeks    Wound care    I will experience scar, bleeding, swelling, pain, crusting and redness. I may experience incomplete removal or recurrence. Risks are bleeding, bruising, swelling, infection, nerve damage, & a large wound. A second procedure may be recommended to obtain the best cosmetic or functional result.       A three month office visit with your Surgeon is recommended for scar evaluation. Please reach out sooner if you have concerns about you surgical site/wound.    Caring for your skin after surgery    After your surgery, a pressure bandage will be placed over the area that has stitches. This is important to prevent bleeding. Please follow these instructions over the next 1 to 2 weeks. Following this regimen will help to prevent complications as your wound heals.     For the first 48 hours after your surgery:    Leave the pressure dressing on and keep it dry. If it should come loose, you may re-tape it, but do not take it off.  Relax and take it easy. Do not do any vigorous exercise or heavy lifting. This could cause the wound to bleed.  Post-Operative pain is usually mild. If you are able to take tylenol, You may take plain or extra-strength Tylenol (acetaminophen) As directed on the bottle (do not take more than 4,000mg in one day). If you are able to take ibuprofen, you can alternate the tylenol and ibuprofen.   Avoid alcohol as this may increase your tendency to bleed.   You may put an ice pack around the bandaged area for 20 minutes at a time as needed. This may help reduce swelling, bruising, and pain. Make sure the ice pack is waterproof so that the pressure bandage doesn t get wet.  If the wound is on the face try to sleep with your head elevated. Either in a recliner or propped up in bed, this will decrease swelling around the eyes.   You may see a small amount of drainage or blood on your pressure bandage. This is normal. However:  If drainage or bleeding continues or  "saturates the bandage, you will need to apply firm pressure over the bandage with a piece of gauze for 15 minutes.  If bleeding continues after applying pressure for 15 minutes, apply an ice pack to the bandaged area for 15 minutes.  If bleeding still continues, call our office or go to the nearest emergency room.    Remove pressure dressing 48 hours after surgery:    Carefully remove the pressure bandage. If it seems sticky or too difficult to get off, you may need to soak it off in the shower.  After the pressure dressing is removed, you may shower and get the wound wet. However, Do Not let the forceful stream of the shower hit the wound directly.  Follow these wound care and dressing change instructions:   In the shower wash the surgical site last with its own separate wash cloth.  You may allow water to run over the site. Take a clean wash cloth wet with soapy warm water and gently pat the suture site to help remove any crust or drainage.   Do Not rub or scrub the site    After site is clean pat dry and apply a thin layer of Vaseline ointment  over the suture site with a cotton swab or clean finger.   Cover the suture site with Telfa (non-stick) dressing. You may tape a piece of gauze over the Telfa for extra protection if you wish.  Continue wound care at least once a day, twice if you are active or around a contaminated environment.  Continue daily wound care until your surgical site is completely healed.   Dissolving stitches, if you have been told your stitches are dissolving they should dissolve in one to one and a half week.      If you are able to take acetaminophen (\"Tylenol,\" etc.) and ibuprofen (\"Advil\" or \"Motrin,\" etc.), then you may STAGGER these medications by taking 400 mg of ibuprofen (usually two tabs) every 8 hours and 1,000 mg of acetaminophen (e.g., two tabs of extra-strength Tylenol) every 8 hours.    This means, for example, that you could take the followin,000 mg of Tylenol, followed 4 " hours later by 400 mg Ibuprofen, followed 4 hours later with 1,000 mg of Tylenol, followed 4 hours later by 400 mg Ibuprofen, followed 4 hours later with 1,000 mg of Tylenol, and so forth.     Essentially, you can take either 1,000 mg of Tylenol or 400 mg of ibuprofen in alternating fashion EVERY FOUR HOURS.    Do NOT exceed more than 4,000 mg of Tylenol or 3,200 mg of ibuprofen per 24 hours. If you are not able to take Tylenol or ibuprofen as above due to other health issues (or a physician has told you directly that you are not allowed to take one of them, say due to pre-existing severe liver or kidney issues), then disregard the above directions.    Scientific evidence supports that this combination/schedule of pain medications is just as effective, if not more effective, than taking a narcotic pain medicine.       Follow up will be a 3 month scar evaluation either in person or via a telephone visit with you sending in a photo via [employer]. Unless you have been told to follow up sooner or if you have concerns and would like to be see sooner. Please call or send us in a [employer] message if possible and attach a photo.        What to expect:    The first couple of days your wound may be tender and may bleed slightly when doing wound care.  There may be swelling and bruising around the wound, especially if it is near the eyes. For your comfort, you may apply ice or cold compresses to the bruises after your have removed the pressure bandage.  The area around your wound may be numb for several weeks or even months.  You may experience periodic sharp pain or mild itching around the wound as it heals.   The suture line will look dark for a while but will lighten over time.       When to call us:    You have bleeding that will not stop after applying pressure and ice.  You have pain that is not controlled with Tylenol (acetaminophen.)  You have signs or symptoms of an infection such as:  Fever over 100 degrees  Fahrenheit  Redness, warmth or foul-smelling drainage from the wound  If you have any questions, or are not sure how to take care of the wound.    Phone numbers:    During business hours (M-F 8:00-4:30 p.m.)    [facility]- [phone] (Option 1 appt. Ask the call representative for the Dermatology Surgery Team)    For Dermatology Surgery scheduling please call [facility] at [phone]    ---------------------------------------------------------  Evenings/Weekends/Holidays    Hospital - [phone] (Ask for the dermatology resident on call. They will connect with the surgery Fellow)  TTY for hearing tsbgnfbu-[phone]  *Ask  to page dermatologist on-call  Emergency Hzxh-[phone]  TTY for hearing impaired- [phone]

## 2024-07-23 NOTE — TELEPHONE ENCOUNTER
Follow up call completed following Mohs procedure with Dr. Benton.       Are you having pain? no  Are you taking pain medication? no  Are you applying ice?  no  Have you had any noticeable bleeding through the bandage? no   Do you have any other concerns? no       Please call [phone] if you have any questions or concerns.    Sayda Bunn RN

## 2024-07-23 NOTE — LETTER
7/23/2024       RE: Shweta Roberts  [address]  [facility] [address]     Dear Colleague,    Thank you for referring your patient, Shweta Roberts, to the [facility] DERMATOLOGIC SURG[facility] [place] at [facility]. Please see a copy of my visit note below.    [facility] Dermatologic Surgery Clinic [place] Procedure Note    Dermatology Problem List:    1. Hx of NMSC  - BCC, L antecubital fossa, s/p Mohs 7/23/24  - BCC, L upper back, s/p excision 4/5/22  2. Hx dysplastic nevi  - L lower lateral abdomen, compound dysplastic nevus with moderate atypia, s/p shave bx 1/15/18  3. 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 Psoriasis  - Elidel 1% cream  5. Stucco keratosis, b/l lower extremities  - Amlactin daily  6. Scalp pruritus and seb derm  - T/Sal shampoo daily   - TAC 0.1% spray daily PRN    7. AKs - cryo  8. Prurigo Nodule  - L inner breast     ____________________________________________    Date of Service:  Jul 23, 2024  Surgery: Mohs micrographic surgery    Case 1  Repair Type: intermediate  Repair Size: 4 cm  Suture Material: 4-0 Monocryl; prolene 5-0  Tumor Type: BCC - Basal cell carcinoma  Location: left antecubital fossa  Derm-Path Accession #: [id number]  PreOp Size: 0.6x0.8 cm  PostOp Size: 2.1x1.8 cm  Mohs Accession #:   Level of Defect: fascia      Procedure:  We discussed the principles of treatment and most likely complications including scarring, bleeding, infection, swelling, pain, crusting, nerve damage, large wound,  incomplete excision, wound dehiscence,  nerve damage, recurrence, and a second procedure may be recommended to obtain the best cosmetic or functional result.    Informed consent was obtained and the patient underwent the procedure as follows:  The patient was placed supine on the operating table.  The cancer was identified,  outlined with a marker, and verified by the patient.  The entire surgical field was prepped with Chorhexidine.  The surgical site was anesthetized using Lidocaine 1% with epi 1:100,000.      The area of clinically apparent tumor was debulked. The layer of tissue was then surgically excised using a #15 blade and was then transferred onto a specimen sheet maintaining the orientation of the specimen. Hemostasis was obtained using Other (comments) (hyfrecation). The wound site was then covered with a dressing while the tissue samples were processed for examination.    The excised tissue was transported to the Mohs histology laboratory maintaining the tissue orientation.  The tissue specimen was relaxed so that the entire surgical margin was in a a single horizontal plane for sectioning and inked for precise mapping.  A precise reference map was drawn to reflect the sectioning of the specimen, colored inking of the margins, and orientation on the patient. The tissue was processed using horizontal sectioning of the base and continuous peripheral margins.  The histopathologic sections were reviewed in conjunction with the reference map.    Total blocks: 1    Total slides:  2    There were no cancer cells visualized on examination, therefore Mohs surgery was complete.    Reconstruction: Intermediate Linear Closure      The patient was taken to the operative suite and placed supine on the operating room table. The defect was identified.  Appropriate markings were made with a marking pen to plan the repair. The area was infiltrated with Lidocaine 1% with epi 1:100,000 and prepped with ChoraPrep and draped with sterile towels.     The wound was debeveled and undermined widely. Cones were excised within relaxed skin tension lines on both sides of the defect. Hemostasis was obtained using Other (comments) (hyfrecation). Deep subcutaneous tissues were then approximated using monocryl 4-0 buried vertical mattress sutures. The  wound edges were then approximated additional  buried sutures were placed in a similar fashion where needed. Prolene 5-0 simple running sutures were carefully placed for maximum eversion and meticulous approximation.    Repair Size: 4 cm    The wound was cleansed with saline and ointment was applied along the wound surface.     A sterile pressure dressing was applied.  Wound care instructions were given verbally and in writing.  The patient left the operating suite in stable condition.  Patient was informed that additional refinement of the resulting surgical scar may be used as a second stage of this reconstruction.     Dr Perez performed the surgery and reconstruction.     The attending surgeon, Dr Benton was present for the key portions of the above major procedure and examination.    Follow-up with derm surgery: PRN.   Suture removal: 2 weeks.     Scribe Disclosure:   I, Angela Yip, am serving as a scribe; to document services personally performed by Dr. Timur Benton - -based on data collection and the provider's statements to me.     Staff Physician Comments:   I saw and evaluated the patient with the Mohs Surgery Fellow (Dr. Ron Perez) and I agree with the assessment and plan and the above description of the procedure as documented by the scribe. I was present for the entire procedure and entire exam.     Timur Benton DO    Department of Dermatology  [facility]: Phone: [phone], Fax:[phone]  [facility]: Phone: [phone], Fax: [phone]    Care and Laboratory Testing Performed at:  [facility]   Clinics and Surgery Center - Dermatologic Surgery Clinic  [address] [place] [address]   Mail Code [id number]   [place] [address]      Again, thank you for allowing me to participate in the care of your patient.       Sincerely,    Timur Benton MD

## 2024-07-23 NOTE — NURSING NOTE
Chief Complaint   Patient presents with    Derm Problem     BCC left antecubital fossa     Valencia MONTEZ RN-BSN  Dermatology Surgery  [phone]

## 2024-07-23 NOTE — PROGRESS NOTES
[facility] Dermatologic Surgery Clinic [place] Procedure Note    Dermatology Problem List:    1. Hx of NMSC  - BCC, L antecubital fossa, s/p Mohs 7/23/24  - BCC, L upper back, s/p excision 4/5/22  2. Hx dysplastic nevi  - L lower lateral abdomen, compound dysplastic nevus with moderate atypia, s/p shave bx 1/15/18  3. 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 Psoriasis  - Elidel 1% cream  5. Stucco keratosis, b/l lower extremities  - Amlactin daily  6. Scalp pruritus and seb derm  - T/Sal shampoo daily   - TAC 0.1% spray daily PRN    7. AKs - cryo  8. Prurigo Nodule  - L inner breast     ____________________________________________    Date of Service:  Jul 23, 2024  Surgery: Mohs micrographic surgery    Case 1  Repair Type: intermediate  Repair Size: 4 cm  Suture Material: 4-0 Monocryl; prolene 5-0  Tumor Type: BCC - Basal cell carcinoma  Location: left antecubital fossa  Derm-Path Accession #: [id number]  PreOp Size: 0.6x0.8 cm  PostOp Size: 2.1x1.8 cm  Mohs Accession #:   Level of Defect: fascia      Procedure:  We discussed the principles of treatment and most likely complications including scarring, bleeding, infection, swelling, pain, crusting, nerve damage, large wound,  incomplete excision, wound dehiscence,  nerve damage, recurrence, and a second procedure may be recommended to obtain the best cosmetic or functional result.    Informed consent was obtained and the patient underwent the procedure as follows:  The patient was placed supine on the operating table.  The cancer was identified, outlined with a marker, and verified by the patient.  The entire surgical field was prepped with Chorhexidine.  The surgical site was anesthetized using Lidocaine 1% with epi 1:100,000.      The area of clinically apparent tumor was debulked. The layer of tissue was then surgically excised using a #15 blade and was then  transferred onto a specimen sheet maintaining the orientation of the specimen. Hemostasis was obtained using Other (comments) (hyfrecation). The wound site was then covered with a dressing while the tissue samples were processed for examination.    The excised tissue was transported to the [facility] histology laboratory maintaining the tissue orientation.  The tissue specimen was relaxed so that the entire surgical margin was in a a single horizontal plane for sectioning and inked for precise mapping.  A precise reference map was drawn to reflect the sectioning of the specimen, colored inking of the margins, and orientation on the patient. The tissue was processed using horizontal sectioning of the base and continuous peripheral margins.  The histopathologic sections were reviewed in conjunction with the reference map.    Total blocks: 1    Total slides:  2    There were no cancer cells visualized on examination, therefore Mohs surgery was complete.    Reconstruction: Intermediate Linear Closure      The patient was taken to the operative suite and placed supine on the operating room table. The defect was identified.  Appropriate markings were made with a marking pen to plan the repair. The area was infiltrated with Lidocaine 1% with epi 1:100,000 and prepped with ChoraPrep and draped with sterile towels.     The wound was debeveled and undermined widely. Cones were excised within relaxed skin tension lines on both sides of the defect. Hemostasis was obtained using Other (comments) (hyfrecation). Deep subcutaneous tissues were then approximated using monocryl 4-0 buried vertical mattress sutures. The wound edges were then approximated additional  buried sutures were placed in a similar fashion where needed. Prolene 5-0 simple running sutures were carefully placed for maximum eversion and meticulous approximation.    Repair Size: 4 cm    The wound was cleansed with saline and ointment was applied along the wound surface.      A sterile pressure dressing was applied.  Wound care instructions were given verbally and in writing.  The patient left the operating suite in stable condition.  Patient was informed that additional refinement of the resulting surgical scar may be used as a second stage of this reconstruction.     Dr Perez performed the surgery and reconstruction.     The attending surgeon, Dr Benton was present for the key portions of the above major procedure and examination.    Follow-up with derm surgery: PRN.   Suture removal: 2 weeks.     Scribe Disclosure:   I, Angela Yip, am serving as a scribe; to document services personally performed by Dr. Timur Benton - -based on data collection and the provider's statements to me.     Staff Physician Comments:   I saw and evaluated the patient with the Mohs Surgery Fellow (Dr. Ron Perez) and I agree with the assessment and plan and the above description of the procedure as documented by the scribe. I was present for the entire procedure and entire exam.     Timur Benton DO    Department of Dermatology  [facility]: Phone: [phone], Fax:[phone]  [facility] Clinical Surgery Center: Phone: [phone], Fax: [phone]    Care and Laboratory Testing Performed at:  [facility]   Clinics and Surgery Center - Dermatologic Surgery Clinic  [address] [place] [address]   Mail Code [id number]   [place] [address]

## 2024-11-18 ENCOUNTER — OFFICE VISIT (OUTPATIENT)
Dept: DERMATOLOGY | Facility: CLINIC | Age: 54
End: 2024-11-18
Payer: COMMERCIAL

## 2024-11-18 DIAGNOSIS — L81.4 LENTIGO: ICD-10-CM

## 2024-11-18 DIAGNOSIS — D18.01 CHERRY ANGIOMA: ICD-10-CM

## 2024-11-18 DIAGNOSIS — D22.5 MELANOCYTIC NEVUS OF TRUNK: ICD-10-CM

## 2024-11-18 DIAGNOSIS — L82.1 SK (SEBORRHEIC KERATOSIS): ICD-10-CM

## 2024-11-18 DIAGNOSIS — L21.9 DERMATITIS, SEBORRHEIC: Primary | ICD-10-CM

## 2024-11-18 DIAGNOSIS — L57.0 ACTINIC KERATOSIS: ICD-10-CM

## 2024-11-18 DIAGNOSIS — Z85.828 HISTORY OF NONMELANOMA SKIN CANCER: ICD-10-CM

## 2024-11-18 PROCEDURE — 99213 OFFICE O/P EST LOW 20 MIN: CPT | Mod: GC | Performed by: DERMATOLOGY

## 2024-11-18 RX ORDER — TRIAMCINOLONE ACETONIDE 0.15 MG/G
AEROSOL, SPRAY TOPICAL
Qty: 100 G | Refills: 5 | Status: SHIPPED | OUTPATIENT
Start: 2024-11-18

## 2024-11-18 ASSESSMENT — PAIN SCALES - GENERAL: PAINLEVEL_OUTOF10: NO PAIN (0)

## 2024-11-18 NOTE — PROGRESS NOTES
[facility] Dermatology Note  Encounter Date: Nov 18, 2024  Office Visit     Dermatology Problem List:   1. Hx of NMSC  - BCC, L antecubital fossa, s/p Mohs 7/23/24  - BCC, L upper back, s/p excision 4/5/22  2. Hx dysplastic nevi  - L lower lateral abdomen, compound dysplastic nevus with moderate atypia, s/p shave bx 1/15/18  3. 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 Psoriasis  - Elidel 1% cream  5. Stucco keratosis, b/l lower extremities  - Amlactin daily  6. Scalp pruritus and seb derm  - T/Sal shampoo daily   - TAC 0.1% spray daily PRN    7. AKs - cryo  8. Prurigo Nodule  - L inner breast    ____________________________________________    Assessment & Plan:     # Actinic keratoses  There are about two rough gritty papules on nose. Patient would like to have the lesions treated at next visit due to insurance (switching jobs).  - Follow-up at next visit for potential cryotherapy    # Seborrheic dermatitis  Some scalp itching and perifollicular scales noted. Reports that using kenalong aerosol PRN helps.   -Kenalog aerosol PRN for scalp refilled      # Benign lesions (cherry angiomas, seborrheic keratoses, lentigines, clinically banal appearing melanocytic nevi)  - Reassurance provided and benign nature of condition discussed    # Hx of NMSC  -Mild hypertrophic scar noted on recent MMS scar. Patient not bothered by the scar and would like to monitor. Discussed options of ILK in the future if symptomatic.   - NERD  - Signs and symptoms of NMSC discussed  - Sun precaution was advised including the use of sun screens of SPF 30 or higher, sun protective clothing, and avoidance of tanning beds    Procedures Performed:   - None  Follow-up: 6 month(s) in-person, or earlier for new or changing lesions    Staff and Resident:     Attending: Dr. Cardona staffed the patient.    Resident    Jose Alfredo Villareal MD MPH   PGY-2 Internal  Medicine / Dermatology (#[id number])  [facility]    I, Renetta Cardona MD, saw this patient with the resident and agree with the resident s findings and plan of care as documented in the resident s note.     ____________________________________________    CC: No chief complaint on file.    HPI:  Ms. Shweta Roberts is a(n) 54 year old female who presents today as a return patient for skin check. No spots of concern today. Recently had BCC excised on Lt. Antecubital fossa by Dr. Betnon on 7/23/24. Scar is healing well. One end does feel raised but she is not having symptoms.   Generalized dry skin. Uses Amlactin.     Labs Reviewed:  N/A    Physical Exam:  Vitals: There were no vitals taken for this visit.  SKIN: Full skin, which includes the head/face, both arms, chest, back, abdomen,both legs, genitalia and/or groin buttocks, digits and/or nails, was examined.  - Nasal bridge with pink gritty papules x 2   - Generalized dry skin   - There are dome shaped bright red papules on the trunk and extremities.   - Multiple regular brown pigmented macules and papules are identified on the trunk and extremities.   - Scattered brown macules on sun exposed areas.  - There are waxy stuck on tan to brown papules on the trunk and extremities.  - Sites of prior skin cancer examined. There is no erythema, telangectasias, nodularity, or pigmentation at the sites. No evidence of recurrence by inspection or palpation. Mild hypertrophy of scar on Lt. Antecubital fossa       Medications:  Current Outpatient Medications   Medication Sig Dispense Refill    albuterol (PROAIR HFA, PROVENTIL HFA, VENTOLIN HFA) 108 (90 BASE) MCG/ACT inhaler Inhale 2 puffs into the lungs every 6 hours as needed for shortness of breath / dyspnea 1 Inhaler 0    benzonatate (TESSALON) 100 MG capsule TAKE 1 TO 2 CAPSULES BY MOUTH THREE TIMES DAILY AS NEEDED      fexofenadine (ALLEGRA) 180 MG tablet Take 1 tablet (180 mg) by  mouth daily 90 tablet 3    LORazepam (ATIVAN) 0.5 MG tablet Take 0.5-1 tablets (0.25-0.5 mg) by mouth every 8 hours as needed for anxiety Do not operate a vehicle after taking this medication 15 tablet 0    mupirocin (BACTROBAN) 2 % external ointment Use 2 times a day to affected area. (Patient not taking: Reported on 12/21/2022) 30 g 3    norethindrone (MICRONOR) 0.35 MG tablet Take 0.35 mg by mouth      norethindrone-ethinyl estradiol (MICROGESTIN) 1.5-30 MG-MCG per tablet Take 1 tablet by mouth daily To be taken continuously (Patient not taking: Reported on 5/13/2024) 112 tablet 0    pimecrolimus (ELIDEL) 1 % external cream Apply twice daily as needed to areas of psoriasis 60 g 3    tretinoin (RETIN-A) 0.025 % external cream Use every night 45 g 1    triamcinolone (KENALOG) 0.147 MG/GM external aerosol Use prn       No current facility-administered medications for this visit.      Past Medical History:   Patient Active Problem List   Diagnosis    Anxiety    Asthma, mild intermittent    Allergic rhinitis    Knee pain    Hamstring muscle strain    Pain in joint involving ankle and foot    Psoriasis    Neoplasm of uncertain behavior of skin    S/P LEEP of cervix    Impingement syndrome of shoulder region, right     Past Medical History:   Diagnosis Date    Allergic rhinitis     Anxiety     Arthritis     Asthma, mild intermittent     CARDIOVASCULAR SCREENING; LDL GOAL LESS THAN 130        CC Renetta Cardona MD  [address] [facility] [address]  [place] [address] on close of this encounter.

## 2024-11-18 NOTE — LETTER
11/18/2024       RE: Shweta Roberts  [address]  [facility] [address]     Dear Colleague,    Thank you for referring your patient, Shweta Roberts, to the [facility] DERMATOLOGY CLINIC [place] at [facility]. Please see a copy of my visit note below.    [facility] Dermatology Note  Encounter Date: Nov 18, 2024  Office Visit     Dermatology Problem List:   1. Hx of NMSC  - BCC, L antecubital fossa, s/p Mohs 7/23/24  - BCC, L upper back, s/p excision 4/5/22  2. Hx dysplastic nevi  - L lower lateral abdomen, compound dysplastic nevus with moderate atypia, s/p shave bx 1/15/18  3. 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 Psoriasis  - Elidel 1% cream  5. Stucco keratosis, b/l lower extremities  - Amlactin daily  6. Scalp pruritus and seb derm  - T/Sal shampoo daily   - TAC 0.1% spray daily PRN    7. AKs - cryo  8. Prurigo Nodule  - L inner breast    ____________________________________________    Assessment & Plan:     # Actinic keratoses  There are about two rough gritty papules on nose. Patient would like to have the lesions treated at next visit due to insurance (switching jobs).  - Follow-up at next visit for potential cryotherapy    # Seborrheic dermatitis  Some scalp itching and perifollicular scales noted. Reports that using kenalong aerosol PRN helps.   -Kenalog aerosol PRN for scalp refilled      # Benign lesions (cherry angiomas, seborrheic keratoses, lentigines, clinically banal appearing melanocytic nevi)  - Reassurance provided and benign nature of condition discussed    # Hx of NMSC  -Mild hypertrophic scar noted on recent MMS scar. Patient not bothered by the scar and would like to monitor. Discussed options of ILK in the future if symptomatic.   - NERD  - Signs and symptoms of NMSC discussed  - Sun precaution was advised including the use of  sun screens of SPF 30 or higher, sun protective clothing, and avoidance of tanning beds    Procedures Performed:   - None  Follow-up: 6 month(s) in-person, or earlier for new or changing lesions    Staff and Resident:     Attending: Dr. Cardona staffed the patient.    Resident    Jose Alfredo Villareal MD MPH   PGY-2 Internal Medicine / Dermatology (#[id number])  [facility]    I, Renetta Cardona MD, saw this patient with the resident and agree with the resident s findings and plan of care as documented in the resident s note.     ____________________________________________    CC: No chief complaint on file.    HPI:  Ms. Shweta Roberts is a(n) 54 year old female who presents today as a return patient for skin check. No spots of concern today. Recently had BCC excised on Lt. Antecubital fossa by Dr. Benton on 7/23/24. Scar is healing well. One end does feel raised but she is not having symptoms.   Generalized dry skin. Uses Amlactin.     Labs Reviewed:  N/A    Physical Exam:  Vitals: There were no vitals taken for this visit.  SKIN: Full skin, which includes the head/face, both arms, chest, back, abdomen,both legs, genitalia and/or groin buttocks, digits and/or nails, was examined.  - Nasal bridge with pink gritty papules x 2   - Generalized dry skin   - There are dome shaped bright red papules on the trunk and extremities.   - Multiple regular brown pigmented macules and papules are identified on the trunk and extremities.   - Scattered brown macules on sun exposed areas.  - There are waxy stuck on tan to brown papules on the trunk and extremities.  - Sites of prior skin cancer examined. There is no erythema, telangectasias, nodularity, or pigmentation at the sites. No evidence of recurrence by inspection or palpation. Mild hypertrophy of scar on Lt. Antecubital fossa       Medications:  Current Outpatient Medications   Medication Sig Dispense Refill     albuterol (PROAIR HFA,  PROVENTIL HFA, VENTOLIN HFA) 108 (90 BASE) MCG/ACT inhaler Inhale 2 puffs into the lungs every 6 hours as needed for shortness of breath / dyspnea 1 Inhaler 0     benzonatate (TESSALON) 100 MG capsule TAKE 1 TO 2 CAPSULES BY MOUTH THREE TIMES DAILY AS NEEDED       fexofenadine (ALLEGRA) 180 MG tablet Take 1 tablet (180 mg) by mouth daily 90 tablet 3     LORazepam (ATIVAN) 0.5 MG tablet Take 0.5-1 tablets (0.25-0.5 mg) by mouth every 8 hours as needed for anxiety Do not operate a vehicle after taking this medication 15 tablet 0     mupirocin (BACTROBAN) 2 % external ointment Use 2 times a day to affected area. (Patient not taking: Reported on 12/21/2022) 30 g 3     norethindrone (MICRONOR) 0.35 MG tablet Take 0.35 mg by mouth       norethindrone-ethinyl estradiol (MICROGESTIN) 1.5-30 MG-MCG per tablet Take 1 tablet by mouth daily To be taken continuously (Patient not taking: Reported on 5/13/2024) 112 tablet 0     pimecrolimus (ELIDEL) 1 % external cream Apply twice daily as needed to areas of psoriasis 60 g 3     tretinoin (RETIN-A) 0.025 % external cream Use every night 45 g 1     triamcinolone (KENALOG) 0.147 MG/GM external aerosol Use prn       No current facility-administered medications for this visit.      Past Medical History:   Patient Active Problem List   Diagnosis     Anxiety     Asthma, mild intermittent     Allergic rhinitis     Knee pain     Hamstring muscle strain     Pain in joint involving ankle and foot     Psoriasis     Neoplasm of uncertain behavior of skin     S/P LEEP of cervix     Impingement syndrome of shoulder region, right     Past Medical History:   Diagnosis Date     Allergic rhinitis      Anxiety      Arthritis      Asthma, mild intermittent      CARDIOVASCULAR SCREENING; LDL GOAL LESS THAN 130        CC Renetta Cardona MD  [address]  [place] [address] on close of this encounter.      Again, thank you for allowing me to participate in the care of your patient.       Sincerely,    Renetta Cardona MD

## 2024-11-18 NOTE — NURSING NOTE
Dermatology Rooming Note    Shweta Roberts's goals for this visit include:   Chief Complaint   Patient presents with    Skin Check     No areas of concern.      Abe Preciado, EMT  Clinic Support  [facility]     [phone]    Employed by [employer]

## 2024-11-19 ENCOUNTER — TELEPHONE (OUTPATIENT)
Dept: DERMATOLOGY | Facility: CLINIC | Age: 54
End: 2024-11-19
Payer: COMMERCIAL

## 2024-11-19 NOTE — TELEPHONE ENCOUNTER
Prior Authorization Retail Medication Request    Medication/Dose: triamcinolone (KENALOG) 0.147 MG/GM external aerosol   Diagnosis and ICD code (if different than what is on RX):    New/renewal/insurance change PA/secondary ins. PA:  Previously Tried and Failed:  see chart  Rationale:  see chart    Insurance   Primary: [facility]  Insurance ID:  [id number]     Clinic Information  Preferred routing pool for dept communication: [facility]

## 2024-11-21 NOTE — TELEPHONE ENCOUNTER
PRIOR AUTHORIZATION DENIED    Medication: triamcinolone (KENALOG) 0.147 MG/GM external aerosol     Denial Date: 11/21/2024    Denial Rational: Insurance requiring patient to try/fail drugs listed below        Appeal Information: This medication was denied. If physician would like to appeal because patient has contraindication or allergy to covered medication please write letter of medical necessity and route back to PA team to initiate.  If no further action is needed please close encounter thank you.

## 2024-11-21 NOTE — TELEPHONE ENCOUNTER
Central Prior Authorization Team   Phone: [phone]    PA Initiation    Medication: triamcinolone (KENALOG) 0.147 MG/GM external aerosol   Insurance Company: [employer] - Phone [phone] Fax [phone]  Pharmacy Filling the Rx: [facility] - [id number] [place], [place] - [address]  Filling Pharmacy Phone: [phone]  Filling Pharmacy Fax:    Start Date: 11/21/2024

## 2024-11-21 NOTE — TELEPHONE ENCOUNTER
Product not available to use via [employer], will check with pt/pharmacy to see if pt paying out of pocket for this as it seems they have used previously. If pt unable to pay out of pocket or wants to try alternative, will forward to provider to advise. [employer] message sent to patient to see if they would like to try alternative or plan to pay out of pocket.    LVM requesting patient call back or read [employer] message. Called pharmacy, they are closed for a meal break until 2pm today. Called again, on hold for over 50 minutes, unable to stay on hold any longer.    Praveen Ku, EMT

## 2024-11-22 NOTE — TELEPHONE ENCOUNTER
Predictifyt sent to patient to see if they would like to get this at [facility]. See 11/21/2024 mychart encounter for more information.    Praveen Ku, EMT

## 2025-01-25 ENCOUNTER — HEALTH MAINTENANCE LETTER (OUTPATIENT)
Age: 55
End: 2025-01-25

## 2025-05-12 ENCOUNTER — OFFICE VISIT (OUTPATIENT)
Dept: DERMATOLOGY | Facility: CLINIC | Age: 55
End: 2025-05-12
Payer: COMMERCIAL

## 2025-05-12 DIAGNOSIS — Z12.83 SKIN CANCER SCREENING: ICD-10-CM

## 2025-05-12 DIAGNOSIS — L73.9 FOLLICULITIS: Primary | ICD-10-CM

## 2025-05-12 DIAGNOSIS — L30.9 DERMATITIS, UNSPECIFIED: ICD-10-CM

## 2025-05-12 DIAGNOSIS — D22.5 MELANOCYTIC NEVUS OF TRUNK: ICD-10-CM

## 2025-05-12 PROCEDURE — 99214 OFFICE O/P EST MOD 30 MIN: CPT | Mod: GC | Performed by: DERMATOLOGY

## 2025-05-12 PROCEDURE — 1126F AMNT PAIN NOTED NONE PRSNT: CPT | Performed by: DERMATOLOGY

## 2025-05-12 RX ORDER — CLINDAMYCIN PHOSPHATE 10 UG/ML
LOTION TOPICAL DAILY
Qty: 60 ML | Refills: 11 | Status: SHIPPED | OUTPATIENT
Start: 2025-05-12

## 2025-05-12 ASSESSMENT — PAIN SCALES - GENERAL: PAINLEVEL_OUTOF10: NO PAIN (0)

## 2025-05-12 NOTE — PATIENT INSTRUCTIONS
Learning the ABCDEs or Melanomas / Self Skin checks    Even if you have carefully practiced sun safety all summer, it's important to continue being vigilant about your skin in fall, winter, and beyond. Throughout the year, you should examine your skin head-to-toe once a month, looking for any suspicious lesions. Self-exams can help you identify potential skin cancers early, when they can almost always be completely cured. As a general rule, to spot either melanomas or non-melanoma skin cancers (such as basal cell carcinoma and squamous cell carcinoma), take note of any new moles or growths, and any existing growths that begin to grow or change significantly in any other way.  Lesions that change, itch, bleed, or don't heal are also alarm signals. Physicians have developed two specific strategies for early recognition ofmelanoma, the deadliest form of skin cancer: the ABCDEs and the Ugly Duckling sign.      Moles, brown spots and growths on the skin are usually harmless -- but not always. Anyone who has more than 100 moles is at greater risk for melanoma. The first signs can appear in one or more atypical moles. That's why it's so important to get to know your skin very well and to recognize any changes in the moles on your body. Look for the ABCDE signs of melanoma, and if you see one or more, make an appointment with a physician immediately.    Common, benign moles look the same over time. Be on alert when moles start to evolve or change and see a doctor. Any change -- in size, shape, color, elevation, or another trait, or any new symptom such as bleeding, itching or crusting -- points to danger.    A - Asymmetry  This benign mole is not asymmetrical. If you draw a line through the middle, the two sides will match, meaning it is symmetrical. If you draw a line through this mole, the two halves will not match, meaning it is asymmetrical, a warning sign for melanoma.    B - Border  A benign mole has smooth, even  borders, unlike melanomas. The borders of an early melanoma tend to be uneven. The edges may be scalloped or notched.     C - Color  Most benign moles are all one color -- often a single shade of brown. Having a variety of colors is another warning signal. A number of different shades of brown, tan or black could appear. A melanoma may also become red, white or blue.      D - Diameter  Benign moles usually have a smaller diameter than malignant ones. Melanomas usually are larger in diameter than the eraser on your pencil tip (  inch or 6mm), but they may sometimes be smaller when first detected.      E - Evolution  Common, benign moles look the same over time. Be on the alert when a mole starts to evolve or change in any way. When a mole is evolving, see a doctor. Any change -- in size, shape, color, elevation, or another trait, or any new symptom such as bleeding, itching or crusting -- points to danger.    The Ugly Duckling Rule  This is a simplified method where you look for any moles that do not match the other moles you have. Even if some of your moles look a little funny we are less concerned if they match one another. We are looking for the outlier - the one that is darker, larger, etc. In A, there is one dominant mole pattern with slight variation in size. The outlier lesion is clearly darker and larger than all other moles. In B, the patient has two predominant mole patterns, one with larger nevi and one with small, darker nevi. The outlier lesion is small but lacks pigmentation. In C, the patient shows only one lesion on the back. If this lesion is changing, symptomatic, or deemed atypical, it should be removed.      Seborrheic keratoses - a mimicker of melanoma    Seborrheic keratosis (seb-o-REE-ik care-uh-TOE-sis) is a common skin growth. It may seem worrisome because it can look like a wart, pre-cancerous skin growth (actinic keratosis), or skin cancer. Despite their appearance, seborrheic keratoses are  "harmless.    Most people get these growths when they are middle aged or older. Because they begin at a later age and can have a flat, waxy or wart-like appearance, seborrheic keratoses are often called the  barnacles of aging  or \"wisdom spots\".    It s possible to have just one of these growths, but most people develop several. Seborrheic keratoses range in color from white to black; however, most are tan or brown. You can find these harmless growths anywhere on the skin, except the palms and soles. Most often, you ll see them on the chest, back, head, or neck. Seborrheic keratoses are not contagious.           Sun Protection      Sunscreen   What does \"broad spectrum mean\"?  Broad spectrum sunscreens protect against both UVA and UVB radiation. UVC is filtered out by the ozone layer.     What does SPF mean?   SPF stands for  Sun Protection Factor  and represents the ability to screen only UVB (burning) rays. UVB rays are mostly blocked in all sunscreens, but only those that contain titanium dioxide, zinc oxide, mexoryl or Parsol 1789 (avobenzone) block the UVA spectrum. Even though a sunscreen is labeled  UVA/UVB Protection  that is not entirely accurate because products that only partially protect against UVA can claim to protect against both UVA and UVB.     What SPF should I chose?   Aim to get a sunscreen that is at least sun protection factor (SPF) 30. SPF 15 provides about 92-93% coverage, SPF 30 about 95-97% coverage, and SPF 45 about 98% coverage. That is to say, SPF 30 is not twice as good as SPF 15. The reason why we recommend SPF 30 is because we are usually only putting on half the necessary amount of sunscreen to achieve the advertised protection. That means that it is very possible that your SPF 30 sunscreen is only providing you with SPF 15 coverage based on how much you are applying. SPF 15 (92-93% coverage) is the absolute minimal that we recommend. Similarly, the benefit of sunscreens with SPF " higher than 50 is that even if you put on less than the required amount, you are likely still getting good protection (ex: even if you apply only half the recommended amount of , it should still provide you with an SPF of 50).     How much should I apply?  If covering your whole body, you should be using 30 grams, or one ounce, which is how much is in one shot glass! That s a THICK layer! May times, you are only applying half the recommended amount, which means that you are only getting half the SPF (for example, you may be using SPF 30 but if you're only applying half the recommended amount, you're only getting SPF 15 protection.      When do I need to wear sunscreen?  Every day, rain or shine! Even on a rainy day or a day when you are only indoors, you are still being exposed harmful UV radiation from the sun. We usually recommend physical/mineral sunscreens (active ingredient is titanium dioxide or zinc oxide) as these ingredients have been around for many years, there is no concern of them being absorbed into the bloodstream, and they are coral reef friendly! However, the best sunscreen is the one that you will use everyday.     What about my kids?  Sunscreen is not recommended for infants under the age of 6 months. Use clothing, shade and sun avoidance for small infants. For kids older than 6 months, we recommend that you should use only mineral/physical sunscreens that have zinc oxide as the active ingredient. Sun-protective clothing and hats are also important for people of all ages.     Sun protective clothing and Resources   Coolibar (www.coolibar.com)  DeepField End (www.Traffline.com)  Athleta (www.athleta.C-Vibes)  Nuvola Systems (www.Global One Financial.C-Vibes)  Carve Designs (RollCall (roll.to)) - affordable  Skinz (uvskinz.com)    Long sleeve - Eliazar Cool DRI UPF 50 or [place] PFG UPF 50  Hoodie - [place] PFG UPF 50  Swimshirt/Rash Guard - Dianna UPF 50 (on Amazon)  Neck - Outdoor Research Ubertubes  (www.outdoorresearch.Newlight Technologies)      Do I need tinted sunscreen?  There is more and more research showing that visible light can also lead to discoloration (such as melasma). Tinted sunscreens (which contain iron oxides) protect against visible light as an added bonus.     What brands do you recommend?  Physical/Mineral Sunscreens (in no particular order)  Elta MD UV Physical Broad-Spectrum SPF 41 Sunscreen (Tinted, $33)  Skin Ceuticals Physical Fusion UV Defense SPF 50 (Tinted, $34)  Unsun Mineral Tinted Face Sunscreen (Tinted, with 2 shade ranges, $29)  It Cosmetics CC+ Cream with SPF 50+ (Tinted, can also double as foundation/coverage -- great range of shades, $40)  Biossance Squalane + Zinc Sheer Mineral Sunscreen SPF 30 PA +++ (goes on white then blends in, $30)  Cerave 100% Mineral Sunscreen SPF 50 Face (good for sensitive skin, $15)  La Roche Posay Anthelios Mineral Zinc Oxide Sunscreen SPF 50 ($35)  La Roche Posay Anthelios Mineral Tinted Sunscreen for Face SPF 50 ($35)  Think Sport Sunscreen (great for sports, though has more of a white cast, $20)  Think Baby Sunscreen (for kids, $21)  Color Science Sunforgettable Total Protection Brush On Shield SPF 50 (Multiple tints, $130)    Chemical Sunscreens  Sofiya Moore Weightless Protection SPF 30 ($48)  Gretchen SPF Brightening Moisturizer ($30)  Urban Skin Complexion Protection Moisturizer SPF 30 ($20)  Total Defense + Repair Broad Spectrum SPF 34 ($68)  Clarins UV PLUS Anti-Pollution Sunscreen Multi-Protection Tint SPF 50 (Multiple tints, $45)  Neutrogena Healthy Skin Glow Sheers Tinted Moisturizer with SPF 20 (Multiple tints, $11)

## 2025-05-12 NOTE — NURSING NOTE
Dermatology Rooming Note    Shweta Roberts's goals for this visit include:   Chief Complaint   Patient presents with    Skin Check     Patient mentions some scar tissue on her left arm that is still causing her pain. Mentions a few areas in her groin that are causing her pain. And a rash on her bra line.     Abe Preciado, EMT  Clinic Support  [facility]     [phone]    Employed by [employer] [facility] [employer]

## 2025-05-12 NOTE — PROGRESS NOTES
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  - ISK, L vertex scalp, s/p shave bx 1/15/18   # Psoriasis  - Elidel 1% cream  # Stucco keratosis, b/l lower extremities  - Amlactin daily  # Scalp pruritus and seb derm  - T/Sal shampoo daily   - TAC 0.1% spray daily PRN    # AKs - cryo  # Prurigo Nodule  - L inner breast  # Eczematous dermatitis   - PRN elidel topically   # Folliculitis, groin   - start BPO wash and clindamycin daily x2 weeks then 3 times per week for maintenance    ____________________________________________    Assessment & Plan:     # FBSE 05/12/25   # Benign lesions (cherry angiomas, seborrheic keratoses, lentigines, clinically banal appearing melanocytic nevi)  - Reassurance provided and benign nature of condition discussed    # Hx of NMSC  -Mild hypertrophic scar noted on recent MMS scar. Patient not bothered by the scar and would like to monitor. Discussed options of ILK in the future if symptomatic.   - NERD  - Signs and symptoms of NMSC discussed  - Sun precaution was advised including the use of sun screens of SPF 30 or higher, sun protective clothing, and avoidance of tanning beds    # Eczematous dermatitis   Discussed the pathogenesis of this condition and empathized the importance of gentle skin care and cream based moisturizer for long term treatment. Discussed use of topical steroid only when skin is itchy.   - PRN elidel topically   - gentle skin regimen     # Folliculitis, groin   Natural  history and etiology discussed. Advised this is a result of inflammation in the hair follicle, which can be exacerbated by normal bacteria on the skin. Targeted therapies to address the bacterial growth can be beneficial. Will adjust topical management as below   - start BPO wash and clindamycin daily x2 weeks then 3 times per week for maintenance      Procedures Performed:   - None    Follow-up: 6-12 month(s) in-person, or earlier for new or changing lesions    Staff and Resident:   Attending: Dr. Cardona staffed the patient.    Resident:  Marianna Champion DO   I, Renetta Cardona MD, saw this patient with the resident and agree with the resident s findings and plan of care as documented in the resident s note.    ____________________________________________    CC: Skin Check (Patient mentions some scar tissue on her left arm that is still causing her pain. Mentions a few areas in her groin that are causing her pain. And a rash on her bra line.)    HPI:  Ms. Shweta Roberts is a(n) 54 year old female who presents today as a return patient for skin check. No spots of concern today.     - denies any lesions of concern today   - Had a BCC excised on Lt. Antecubital fossa by Dr. Benton on 7/23/24. Scar is healing well. One end does feel raised and occasionally itches.   - patient reports a small area of itching on the mid back. No injury or trauma to the cervical region   - Also has some small pimple bumps in the groin that have occasionally drained. Reports that she has stopped shaving   - uses sunscreen and wears sun protective clothing: yes , has been doing well this    - history of sunburns: in the past   - history of skin cancer: yes  - family history of melanoma: no     Labs Reviewed:  N/A    Physical Exam:  Vitals: There were no vitals taken for this visit.  SKIN: Full skin, which includes the head/face, both arms, chest, back, abdomen,both legs, genitalia and/or groin buttocks, digits and/or nails, was  examined.  - 2 small pustules surrounding hair follicle in the left groin   - small poorly demarcated pink papules, some excoriated on the mid back with evidence of excoriation     - Generalized dry skin   - There are dome shaped bright red papules on the trunk and extremities.   - Multiple regular brown pigmented macules and papules are identified on the trunk and extremities.   - Scattered brown macules on sun exposed areas.   - There are waxy stuck on tan to brown papules on the trunk and extremities.    - Sites of prior skin cancer examined on the left antecubital fossa. There is no erythema, telangectasias, nodularity, or pigmentation at the sites. No evidence of recurrence by inspection or palpation. Mild hypertrophy of scar on Lt. Antecubital fossa     Medications:  Current Outpatient Medications   Medication Sig Dispense Refill    albuterol (PROAIR HFA, PROVENTIL HFA, VENTOLIN HFA) 108 (90 BASE) MCG/ACT inhaler Inhale 2 puffs into the lungs every 6 hours as needed for shortness of breath / dyspnea 1 Inhaler 0    benzonatate (TESSALON) 100 MG capsule TAKE 1 TO 2 CAPSULES BY MOUTH THREE TIMES DAILY AS NEEDED      benzoyl peroxide 5 % external liquid Use as a wash in the groin area or in other areas with pimple bumps daily 226 g 5    clindamycin (CLEOCIN T) 1 % external lotion Apply topically daily. Apply to areas with pimple bumps 60 mL 11    LORazepam (ATIVAN) 0.5 MG tablet Take 0.5-1 tablets (0.25-0.5 mg) by mouth every 8 hours as needed for anxiety Do not operate a vehicle after taking this medication 15 tablet 0    norethindrone (MICRONOR) 0.35 MG tablet Take 0.35 mg by mouth      pimecrolimus (ELIDEL) 1 % external cream Apply twice daily as needed to areas of psoriasis 60 g 3    tretinoin (RETIN-A) 0.025 % external cream Use every night 45 g 1    triamcinolone (KENALOG) 0.147 MG/GM external aerosol Spray to inflammed area on scalp as needed 63 g 5    fexofenadine (ALLEGRA) 180 MG tablet Take 1 tablet (180  mg) by mouth daily 90 tablet 3    mupirocin (BACTROBAN) 2 % external ointment Use 2 times a day to affected area. (Patient not taking: Reported on 5/12/2025) 30 g 3    norethindrone-ethinyl estradiol (MICROGESTIN) 1.5-30 MG-MCG per tablet Take 1 tablet by mouth daily To be taken continuously (Patient not taking: Reported on 5/12/2025) 112 tablet 0     No current facility-administered medications for this visit.      Past Medical History:   Patient Active Problem List   Diagnosis    Anxiety    Asthma, mild intermittent    Allergic rhinitis    Knee pain    Hamstring muscle strain    Pain in joint involving ankle and foot    Psoriasis    Neoplasm of uncertain behavior of skin    S/P LEEP of cervix    Impingement syndrome of shoulder region, right     Past Medical History:   Diagnosis Date    Allergic rhinitis     Anxiety     Arthritis     Asthma, mild intermittent     CARDIOVASCULAR SCREENING; LDL GOAL LESS THAN 130        CC Renetta Cardona MD  [address] [facility] [address]  [place] [address] on close of this encounter.

## 2025-05-12 NOTE — LETTER
5/12/2025       RE: Shweta Roberts  [address]  [facility] [address]     Dear Colleague,    Thank you for referring your patient, Shweta Roberts, to the [facility] [place] at [facility]. Please see a copy of my visit note below.    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  - ISK, L vertex scalp, s/p shave bx 1/15/18   # Psoriasis  - Elidel 1% cream  # Stucco keratosis, b/l lower extremities  - Amlactin daily  # Scalp pruritus and seb derm  - T/Sal shampoo daily   - TAC 0.1% spray daily PRN    # AKs - cryo  # Prurigo Nodule  - L inner breast  # Eczematous dermatitis   - PRN elidel topically   # Folliculitis, groin   - start BPO wash and clindamycin daily x2 weeks then 3 times per week for maintenance    ____________________________________________    Assessment & Plan:     # FBSE 05/12/25   # Benign lesions (cherry angiomas, seborrheic keratoses, lentigines, clinically banal appearing melanocytic nevi)  - Reassurance provided and benign nature of condition discussed    # Hx of NMSC  -Mild hypertrophic scar noted on recent MMS scar. Patient not bothered by the scar and would like to monitor. Discussed options of ILK in the future if symptomatic.   - NERD  - Signs and symptoms of NMSC discussed  - Sun precaution was advised including the use of sun screens of SPF 30 or higher, sun protective clothing, and avoidance of tanning  beds    # Eczematous dermatitis   Discussed the pathogenesis of this condition and empathized the importance of gentle skin care and cream based moisturizer for long term treatment. Discussed use of topical steroid only when skin is itchy.   - PRN elidel topically   - gentle skin regimen     # Folliculitis, groin   Natural history and etiology discussed. Advised this is a result of inflammation in the hair follicle, which can be exacerbated by normal bacteria on the skin. Targeted therapies to address the bacterial growth can be beneficial. Will adjust topical management as below   - start BPO wash and clindamycin daily x2 weeks then 3 times per week for maintenance      Procedures Performed:   - None    Follow-up: 6-12 month(s) in-person, or earlier for new or changing lesions    Staff and Resident:   Attending: Dr. Cardona staffed the patient.    Resident:  Marianna Champion DO   I, Renetta Cardona MD, saw this patient with the resident and agree with the resident s findings and plan of care as documented in the resident s note.    ____________________________________________    CC: Skin Check (Patient mentions some scar tissue on her left arm that is still causing her pain. Mentions a few areas in her groin that are causing her pain. And a rash on her bra line.)    HPI:  Ms. Shweta Roberts is a(n) 54 year old female who presents today as a return patient for skin check. No spots of concern today.     - denies any lesions of concern today   - Had a BCC excised on Lt. Antecubital fossa by Dr. Benton on 7/23/24. Scar is healing well. One end does feel raised and occasionally itches.   - patient reports a small area of itching on the mid back. No injury or trauma to the cervical region   - Also has some small pimple bumps in the groin that have occasionally drained. Reports that she has stopped shaving   - uses sunscreen and wears sun protective clothing: yes , has been doing well this    - history of sunburns:  in the past   - history of skin cancer: yes  - family history of melanoma: no     Labs Reviewed:  N/A    Physical Exam:  Vitals: There were no vitals taken for this visit.  SKIN: Full skin, which includes the head/face, both arms, chest, back, abdomen,both legs, genitalia and/or groin buttocks, digits and/or nails, was examined.  - 2 small pustules surrounding hair follicle in the left groin   - small poorly demarcated pink papules, some excoriated on the mid back with evidence of excoriation     - Generalized dry skin   - There are dome shaped bright red papules on the trunk and extremities.   - Multiple regular brown pigmented macules and papules are identified on the trunk and extremities.   - Scattered brown macules on sun exposed areas.   - There are waxy stuck on tan to brown papules on the trunk and extremities.    - Sites of prior skin cancer examined on the left antecubital fossa. There is no erythema, telangectasias, nodularity, or pigmentation at the sites. No evidence of recurrence by inspection or palpation. Mild hypertrophy of scar on Lt. Antecubital fossa     Medications:  Current Outpatient Medications   Medication Sig Dispense Refill     albuterol (PROAIR HFA, PROVENTIL HFA, VENTOLIN HFA) 108 (90 BASE) MCG/ACT inhaler Inhale 2 puffs into the lungs every 6 hours as needed for shortness of breath / dyspnea 1 Inhaler 0     benzonatate (TESSALON) 100 MG capsule TAKE 1 TO 2 CAPSULES BY MOUTH THREE TIMES DAILY AS NEEDED       benzoyl peroxide 5 % external liquid Use as a wash in the groin area or in other areas with pimple bumps daily 226 g 5     clindamycin (CLEOCIN T) 1 % external lotion Apply topically daily. Apply to areas with pimple bumps 60 mL 11     LORazepam (ATIVAN) 0.5 MG tablet Take 0.5-1 tablets (0.25-0.5 mg) by mouth every 8 hours as needed for anxiety Do not operate a vehicle after taking this medication 15 tablet 0     norethindrone (MICRONOR) 0.35 MG tablet Take 0.35 mg by mouth        pimecrolimus (ELIDEL) 1 % external cream Apply twice daily as needed to areas of psoriasis 60 g 3     tretinoin (RETIN-A) 0.025 % external cream Use every night 45 g 1     triamcinolone (KENALOG) 0.147 MG/GM external aerosol Spray to inflammed area on scalp as needed 63 g 5     fexofenadine (ALLEGRA) 180 MG tablet Take 1 tablet (180 mg) by mouth daily 90 tablet 3     mupirocin (BACTROBAN) 2 % external ointment Use 2 times a day to affected area. (Patient not taking: Reported on 5/12/2025) 30 g 3     norethindrone-ethinyl estradiol (MICROGESTIN) 1.5-30 MG-MCG per tablet Take 1 tablet by mouth daily To be taken continuously (Patient not taking: Reported on 5/12/2025) 112 tablet 0     No current facility-administered medications for this visit.      Past Medical History:   Patient Active Problem List   Diagnosis     Anxiety     Asthma, mild intermittent     Allergic rhinitis     Knee pain     Hamstring muscle strain     Pain in joint involving ankle and foot     Psoriasis     Neoplasm of uncertain behavior of skin     S/P LEEP of cervix     Impingement syndrome of shoulder region, right     Past Medical History:   Diagnosis Date     Allergic rhinitis      Anxiety      Arthritis      Asthma, mild intermittent      CARDIOVASCULAR SCREENING; LDL GOAL LESS THAN 130        CC Renetta Cardona MD  [address] [facility] [address]  [place] [address] on close of this encounter.      Again, thank you for allowing me to participate in the care of your patient.      Sincerely,    Renetta Cardona MD